# Patient Record
Sex: MALE | Race: BLACK OR AFRICAN AMERICAN | Employment: FULL TIME | URBAN - NONMETROPOLITAN AREA
[De-identification: names, ages, dates, MRNs, and addresses within clinical notes are randomized per-mention and may not be internally consistent; named-entity substitution may affect disease eponyms.]

---

## 2017-08-13 ENCOUNTER — APPOINTMENT (OUTPATIENT)
Dept: GENERAL RADIOLOGY | Age: 42
End: 2017-08-13
Payer: COMMERCIAL

## 2017-08-13 ENCOUNTER — HOSPITAL ENCOUNTER (EMERGENCY)
Age: 42
Discharge: HOME | End: 2017-08-13
Attending: EMERGENCY MEDICINE
Payer: COMMERCIAL

## 2017-08-13 VITALS
WEIGHT: 270 LBS | HEIGHT: 74 IN | RESPIRATION RATE: 20 BRPM | BODY MASS INDEX: 34.65 KG/M2 | SYSTOLIC BLOOD PRESSURE: 134 MMHG | HEART RATE: 84 BPM | DIASTOLIC BLOOD PRESSURE: 80 MMHG | TEMPERATURE: 98.3 F | OXYGEN SATURATION: 97 %

## 2017-08-13 DIAGNOSIS — J02.9 ACUTE PHARYNGITIS, UNSPECIFIED ETIOLOGY: ICD-10-CM

## 2017-08-13 DIAGNOSIS — S60.221A CONTUSION OF RIGHT HAND, INITIAL ENCOUNTER: ICD-10-CM

## 2017-08-13 DIAGNOSIS — S46.912A SHOULDER STRAIN, LEFT, INITIAL ENCOUNTER: Primary | ICD-10-CM

## 2017-08-13 PROCEDURE — 96372 THER/PROPH/DIAG INJ SC/IM: CPT

## 2017-08-13 PROCEDURE — A4565 SLINGS: HCPCS

## 2017-08-13 PROCEDURE — 6360000002 HC RX W HCPCS: Performed by: EMERGENCY MEDICINE

## 2017-08-13 PROCEDURE — 73030 X-RAY EXAM OF SHOULDER: CPT

## 2017-08-13 PROCEDURE — 99283 EMERGENCY DEPT VISIT LOW MDM: CPT

## 2017-08-13 PROCEDURE — 71020 XR CHEST STANDARD TWO VW: CPT

## 2017-08-13 PROCEDURE — 6370000000 HC RX 637 (ALT 250 FOR IP): Performed by: EMERGENCY MEDICINE

## 2017-08-13 RX ORDER — CYCLOBENZAPRINE HCL 10 MG
10 TABLET ORAL 3 TIMES DAILY PRN
Qty: 30 TABLET | Refills: 0 | Status: SHIPPED | OUTPATIENT
Start: 2017-08-13 | End: 2017-08-23

## 2017-08-13 RX ORDER — OXYCODONE HYDROCHLORIDE AND ACETAMINOPHEN 5; 325 MG/1; MG/1
1 TABLET ORAL EVERY 4 HOURS PRN
Qty: 20 TABLET | Refills: 0 | Status: SHIPPED | OUTPATIENT
Start: 2017-08-13 | End: 2021-02-02

## 2017-08-13 RX ORDER — NAPROXEN 500 MG/1
500 TABLET ORAL 2 TIMES DAILY
Qty: 60 TABLET | Refills: 0 | Status: SHIPPED | OUTPATIENT
Start: 2017-08-13 | End: 2021-11-27 | Stop reason: ALTCHOICE

## 2017-08-13 RX ORDER — AMOXICILLIN AND CLAVULANATE POTASSIUM 875; 125 MG/1; MG/1
1 TABLET, FILM COATED ORAL 2 TIMES DAILY
Qty: 20 TABLET | Refills: 0 | Status: SHIPPED | OUTPATIENT
Start: 2017-08-13 | End: 2017-08-23

## 2017-08-13 RX ORDER — ORPHENADRINE CITRATE 30 MG/ML
60 INJECTION INTRAMUSCULAR; INTRAVENOUS ONCE
Status: COMPLETED | OUTPATIENT
Start: 2017-08-13 | End: 2017-08-13

## 2017-08-13 RX ORDER — AMOXICILLIN AND CLAVULANATE POTASSIUM 875; 125 MG/1; MG/1
1 TABLET, FILM COATED ORAL ONCE
Status: COMPLETED | OUTPATIENT
Start: 2017-08-13 | End: 2017-08-13

## 2017-08-13 RX ORDER — KETOROLAC TROMETHAMINE 30 MG/ML
30 INJECTION, SOLUTION INTRAMUSCULAR; INTRAVENOUS ONCE
Status: COMPLETED | OUTPATIENT
Start: 2017-08-13 | End: 2017-08-13

## 2017-08-13 RX ORDER — METHYLPREDNISOLONE SODIUM SUCCINATE 125 MG/2ML
80 INJECTION, POWDER, LYOPHILIZED, FOR SOLUTION INTRAMUSCULAR; INTRAVENOUS ONCE
Status: COMPLETED | OUTPATIENT
Start: 2017-08-13 | End: 2017-08-13

## 2017-08-13 RX ADMIN — ORPHENADRINE CITRATE 60 MG: 30 INJECTION INTRAMUSCULAR; INTRAVENOUS at 21:30

## 2017-08-13 RX ADMIN — HYDROMORPHONE HYDROCHLORIDE 1 MG: 1 INJECTION, SOLUTION INTRAMUSCULAR; INTRAVENOUS; SUBCUTANEOUS at 21:30

## 2017-08-13 RX ADMIN — KETOROLAC TROMETHAMINE 30 MG: 30 INJECTION, SOLUTION INTRAMUSCULAR at 21:30

## 2017-08-13 RX ADMIN — METHYLPREDNISOLONE SODIUM SUCCINATE 80 MG: 125 INJECTION, POWDER, FOR SOLUTION INTRAMUSCULAR; INTRAVENOUS at 21:30

## 2017-08-13 RX ADMIN — AMOXICILLIN AND CLAVULANATE POTASSIUM 1 TABLET: 875; 125 TABLET, FILM COATED ORAL at 21:30

## 2017-08-13 ASSESSMENT — PAIN DESCRIPTION - LOCATION
LOCATION: SHOULDER
LOCATION: HAND

## 2017-08-13 ASSESSMENT — PAIN DESCRIPTION - DESCRIPTORS
DESCRIPTORS: DULL;ACHING;SHARP;STABBING
DESCRIPTORS: ACHING

## 2017-08-13 ASSESSMENT — ENCOUNTER SYMPTOMS
ABDOMINAL PAIN: 0
CONSTIPATION: 0
EYE PAIN: 0
TROUBLE SWALLOWING: 0
VOMITING: 0
WHEEZING: 0
CHOKING: 0
BLOOD IN STOOL: 0
CHEST TIGHTNESS: 0
VOICE CHANGE: 0
SHORTNESS OF BREATH: 0
EYE ITCHING: 0
NAUSEA: 0
SORE THROAT: 1
DIARRHEA: 0
RHINORRHEA: 0
EYE DISCHARGE: 0
PHOTOPHOBIA: 0
BACK PAIN: 0
ABDOMINAL DISTENTION: 0
EYE REDNESS: 0
SINUS PRESSURE: 0
COUGH: 0

## 2017-08-13 ASSESSMENT — PAIN DESCRIPTION - PAIN TYPE
TYPE: ACUTE PAIN
TYPE: ACUTE PAIN

## 2017-08-13 ASSESSMENT — PAIN DESCRIPTION - ORIENTATION
ORIENTATION: LEFT
ORIENTATION: LEFT

## 2017-08-13 ASSESSMENT — PAIN DESCRIPTION - FREQUENCY
FREQUENCY: CONTINUOUS
FREQUENCY: CONTINUOUS

## 2017-08-13 ASSESSMENT — PAIN SCALES - GENERAL
PAINLEVEL_OUTOF10: 8
PAINLEVEL_OUTOF10: 2
PAINLEVEL_OUTOF10: 7

## 2019-11-02 ENCOUNTER — HOSPITAL ENCOUNTER (EMERGENCY)
Age: 44
Discharge: HOME | End: 2019-11-02
Payer: COMMERCIAL

## 2019-11-02 VITALS
OXYGEN SATURATION: 97 % | SYSTOLIC BLOOD PRESSURE: 127 MMHG | WEIGHT: 285 LBS | HEART RATE: 100 BPM | TEMPERATURE: 100.1 F | RESPIRATION RATE: 20 BRPM | DIASTOLIC BLOOD PRESSURE: 79 MMHG | BODY MASS INDEX: 36.57 KG/M2 | HEIGHT: 74 IN

## 2019-11-02 DIAGNOSIS — J01.00 ACUTE NON-RECURRENT MAXILLARY SINUSITIS: Primary | ICD-10-CM

## 2019-11-02 DIAGNOSIS — J40 BRONCHITIS: ICD-10-CM

## 2019-11-02 DIAGNOSIS — R09.89 CHEST CONGESTION: ICD-10-CM

## 2019-11-02 PROCEDURE — 99212 OFFICE O/P EST SF 10 MIN: CPT

## 2019-11-02 PROCEDURE — 99213 OFFICE O/P EST LOW 20 MIN: CPT | Performed by: NURSE PRACTITIONER

## 2019-11-02 RX ORDER — TOPIRAMATE 25 MG/1
25 CAPSULE, COATED PELLETS ORAL 2 TIMES DAILY
COMMUNITY
End: 2021-02-02

## 2019-11-02 RX ORDER — AZITHROMYCIN 250 MG/1
TABLET, FILM COATED ORAL
Qty: 6 TABLET | Refills: 0 | Status: SHIPPED | OUTPATIENT
Start: 2019-11-02 | End: 2021-02-02

## 2019-11-02 RX ORDER — PREDNISONE 10 MG/1
TABLET ORAL
Qty: 21 TABLET | Refills: 0 | Status: SHIPPED | OUTPATIENT
Start: 2019-11-02 | End: 2021-02-02

## 2019-11-02 RX ORDER — ALBUTEROL SULFATE 2.5 MG/3ML
2.5 SOLUTION RESPIRATORY (INHALATION) EVERY 4 HOURS PRN
Qty: 120 EACH | Refills: 0 | Status: SHIPPED | OUTPATIENT
Start: 2019-11-02 | End: 2021-02-02

## 2019-11-02 ASSESSMENT — ENCOUNTER SYMPTOMS
RHINORRHEA: 1
SORE THROAT: 1
SINUS PAIN: 1
COLOR CHANGE: 0
BACK PAIN: 0
SINUS PRESSURE: 1

## 2019-11-02 ASSESSMENT — PAIN DESCRIPTION - PAIN TYPE: TYPE: ACUTE PAIN

## 2019-11-02 ASSESSMENT — PAIN DESCRIPTION - LOCATION: LOCATION: CHEST

## 2019-11-02 ASSESSMENT — PAIN DESCRIPTION - ORIENTATION: ORIENTATION: MID

## 2019-11-02 ASSESSMENT — PAIN DESCRIPTION - FREQUENCY: FREQUENCY: INTERMITTENT

## 2020-08-22 LAB — SARS-COV-2: NOT DETECTED

## 2021-02-02 ENCOUNTER — APPOINTMENT (OUTPATIENT)
Dept: GENERAL RADIOLOGY | Age: 46
End: 2021-02-02
Payer: COMMERCIAL

## 2021-02-02 ENCOUNTER — HOSPITAL ENCOUNTER (EMERGENCY)
Age: 46
Discharge: HOME | End: 2021-02-02
Payer: COMMERCIAL

## 2021-02-02 VITALS
HEART RATE: 99 BPM | HEIGHT: 74 IN | RESPIRATION RATE: 18 BRPM | BODY MASS INDEX: 38.89 KG/M2 | SYSTOLIC BLOOD PRESSURE: 141 MMHG | WEIGHT: 303 LBS | DIASTOLIC BLOOD PRESSURE: 86 MMHG | OXYGEN SATURATION: 95 % | TEMPERATURE: 100.1 F

## 2021-02-02 DIAGNOSIS — J18.9 PNEUMONIA OF LEFT LOWER LOBE DUE TO INFECTIOUS ORGANISM: Primary | ICD-10-CM

## 2021-02-02 DIAGNOSIS — Z20.822 SUSPECTED COVID-19 VIRUS INFECTION: ICD-10-CM

## 2021-02-02 LAB
FLU A ANTIGEN: NEGATIVE
INFLUENZA B AG, EIA: NEGATIVE

## 2021-02-02 PROCEDURE — 99214 OFFICE O/P EST MOD 30 MIN: CPT | Performed by: NURSE PRACTITIONER

## 2021-02-02 PROCEDURE — 71046 X-RAY EXAM CHEST 2 VIEWS: CPT

## 2021-02-02 PROCEDURE — 87804 INFLUENZA ASSAY W/OPTIC: CPT

## 2021-02-02 PROCEDURE — 99213 OFFICE O/P EST LOW 20 MIN: CPT

## 2021-02-02 RX ORDER — ACETAMINOPHEN 500 MG
500 TABLET ORAL EVERY 4 HOURS PRN
Qty: 20 TABLET | Refills: 0 | Status: SHIPPED | OUTPATIENT
Start: 2021-02-02 | End: 2021-02-12

## 2021-02-02 RX ORDER — GUAIFENESIN 600 MG/1
600 TABLET, EXTENDED RELEASE ORAL 2 TIMES DAILY
Qty: 20 TABLET | Refills: 0 | Status: SHIPPED | OUTPATIENT
Start: 2021-02-02 | End: 2021-02-12

## 2021-02-02 RX ORDER — ACETAMINOPHEN 500 MG
1000 TABLET ORAL EVERY 6 HOURS PRN
COMMUNITY
End: 2021-02-02

## 2021-02-02 RX ORDER — DOXYCYCLINE HYCLATE 100 MG
100 TABLET ORAL 2 TIMES DAILY
Qty: 20 TABLET | Refills: 0 | Status: SHIPPED | OUTPATIENT
Start: 2021-02-02 | End: 2021-02-07

## 2021-02-02 RX ORDER — ALBUTEROL SULFATE 90 UG/1
2 AEROSOL, METERED RESPIRATORY (INHALATION) EVERY 4 HOURS PRN
Qty: 1 INHALER | Refills: 0 | Status: SHIPPED | OUTPATIENT
Start: 2021-02-02 | End: 2022-01-02 | Stop reason: SDUPTHER

## 2021-02-02 ASSESSMENT — ENCOUNTER SYMPTOMS
COUGH: 0
EYE ITCHING: 0
SINUS PRESSURE: 0
ABDOMINAL PAIN: 0
DIARRHEA: 1
SINUS PAIN: 0
VOMITING: 0
SORE THROAT: 0
WHEEZING: 0
NAUSEA: 1
SHORTNESS OF BREATH: 0
EYE REDNESS: 0

## 2021-02-02 ASSESSMENT — PAIN SCALES - GENERAL: PAINLEVEL_OUTOF10: 7

## 2021-02-02 NOTE — ED PROVIDER NOTES
[id number]       Chief Complaint   Patient presents with    Headache     behind eyes,  eyes feeel like muscle sore, hot and chills,        Nurses Notes reviewed and I agree except as noted in the HPI. HISTORY OF PRESENT ILLNESS   Guerita Naik is a 39 y.o. male who presents for evaluation of not feeling well for a week. He states that he has a headache that almost feels like a sinus infection/pressure but he has no congestion, stuffy nose, or runny nose. He states that the headaches have been responding to Tylenol. He states that he has had fever, chills, and body aches. He states that he is tested twice weekly for COVID-19 through his employer. He states that he was tested on Monday and is awaiting those results. REVIEW OF SYSTEMS     Review of Systems   Constitutional: Positive for chills. Negative for fatigue and fever. HENT: Negative for congestion, ear pain, sinus pressure, sinus pain and sore throat. Eyes: Negative for redness and itching. Respiratory: Negative for cough, shortness of breath and wheezing. Cardiovascular: Negative for chest pain and palpitations. Gastrointestinal: Positive for diarrhea and nausea. Negative for abdominal pain and vomiting. Musculoskeletal: Positive for myalgias. Negative for joint swelling. Skin: Negative for rash. Allergic/Immunologic: Negative for environmental allergies and food allergies. Neurological: Positive for headaches. Negative for dizziness. PAST MEDICAL HISTORY         Diagnosis Date    Gout     Migraine        SURGICAL HISTORY     Patient  has a past surgical history that includes fracture surgery; Testicle torsion reduction; and Nose surgery.     CURRENT MEDICATIONS       Previous Medications    ALLOPURINOL (ZYLOPRIM) 100 MG TABLET    Take 1 tablet by mouth daily    IBUPROFEN (ADVIL;MOTRIN) 800 MG TABLET    Take 1 tablet by mouth every 8 hours as needed for Pain    NAPROXEN (NAPROSYN) 500 MG TABLET    Take 1 tablet by mouth 2 times daily    OXYMETAZOLINE (AFRIN 12 HOUR) 0.05 % NASAL SPRAY    2 sprays by Nasal route 2 times daily. RESPIRATORY THERAPY SUPPLIES (NEBULIZER COMPRESSOR) KIT    1 kit by Does not apply route once for 1 dose       ALLERGIES     Patient is is allergic to shellfish-derived products. FAMILY HISTORY     Patient'sfamily history includes Diabetes in his father and mother; Heart Disease in his mother; High Blood Pressure in his father and mother. SOCIAL HISTORY     Patient  reports that he has been smoking cigarettes. He has been smoking about 1.00 pack per day. He has never used smokeless tobacco. He reports previous alcohol use. He reports that he does not use drugs. PHYSICAL EXAM     ED TRIAGE VITALS  BP: (!) 141/86, Temp: 100.1 °F (37.8 °C), Pulse: 99, Resp: 18, SpO2: 95 %  Physical Exam  Vitals signs and nursing note reviewed. Constitutional:       General: He is not in acute distress. Appearance: Normal appearance. He is well-developed and well-groomed. HENT:      Head: Normocephalic and atraumatic. Right Ear: External ear normal.      Left Ear: External ear normal.      Mouth/Throat:      Lips: Pink. Mouth: Mucous membranes are moist.   Eyes:      Conjunctiva/sclera:      Right eye: Right conjunctiva is not injected. Left eye: Left conjunctiva is not injected. Pupils: Pupils are equal.   Neck:      Musculoskeletal: Normal range of motion. Cardiovascular:      Rate and Rhythm: Normal rate. Heart sounds: Normal heart sounds. Pulmonary:      Effort: Pulmonary effort is normal. No respiratory distress. Breath sounds: Normal air entry. Examination of the right-lower field reveals decreased breath sounds. Examination of the left-lower field reveals decreased breath sounds. Decreased breath sounds present. No wheezing or rhonchi. Lymphadenopathy:      Cervical: No cervical adenopathy.    Skin:

## 2021-02-02 NOTE — ED TRIAGE NOTES
TO room 1 c/o headache, eyes hurt, chills, hot flashes x 1 week.  Test 2 tiems week for covid  for work  Fridays was negativce and mondays still pending

## 2021-02-06 PROCEDURE — 99285 EMERGENCY DEPT VISIT HI MDM: CPT

## 2021-02-07 ENCOUNTER — APPOINTMENT (OUTPATIENT)
Dept: CT IMAGING | Age: 46
End: 2021-02-07
Payer: COMMERCIAL

## 2021-02-07 ENCOUNTER — HOSPITAL ENCOUNTER (EMERGENCY)
Age: 46
Discharge: HOME | End: 2021-02-07
Payer: COMMERCIAL

## 2021-02-07 ENCOUNTER — APPOINTMENT (OUTPATIENT)
Dept: GENERAL RADIOLOGY | Age: 46
End: 2021-02-07
Payer: COMMERCIAL

## 2021-02-07 VITALS
BODY MASS INDEX: 38.5 KG/M2 | TEMPERATURE: 98.7 F | HEART RATE: 78 BPM | DIASTOLIC BLOOD PRESSURE: 92 MMHG | HEIGHT: 74 IN | WEIGHT: 300 LBS | OXYGEN SATURATION: 95 % | RESPIRATION RATE: 14 BRPM | SYSTOLIC BLOOD PRESSURE: 103 MMHG

## 2021-02-07 DIAGNOSIS — J01.00 ACUTE MAXILLARY SINUSITIS, RECURRENCE NOT SPECIFIED: ICD-10-CM

## 2021-02-07 DIAGNOSIS — J12.9 VIRAL PNEUMONIA: Primary | ICD-10-CM

## 2021-02-07 LAB
ANION GAP SERPL CALCULATED.3IONS-SCNC: 10 MEQ/L (ref 8–16)
ATYPICAL LYMPHOCYTES: ABNORMAL %
BASOPHILS # BLD: 0.4 %
BASOPHILS ABSOLUTE: 0 THOU/MM3 (ref 0–0.1)
BUN BLDV-MCNC: 20 MG/DL (ref 7–22)
C-REACTIVE PROTEIN: 1.44 MG/DL (ref 0–1)
CALCIUM SERPL-MCNC: 8.8 MG/DL (ref 8.5–10.5)
CHLORIDE BLD-SCNC: 103 MEQ/L (ref 98–111)
CO2: 21 MEQ/L (ref 23–33)
CREAT SERPL-MCNC: 1.1 MG/DL (ref 0.4–1.2)
D-DIMER QUANTITATIVE: 499 NG/ML FEU (ref 0–500)
EKG ATRIAL RATE: 97 BPM
EKG P AXIS: 70 DEGREES
EKG P-R INTERVAL: 154 MS
EKG Q-T INTERVAL: 336 MS
EKG QRS DURATION: 88 MS
EKG QTC CALCULATION (BAZETT): 424 MS
EKG R AXIS: 48 DEGREES
EKG T AXIS: 37 DEGREES
EKG VENTRICULAR RATE: 96 BPM
EOSINOPHIL # BLD: 0.4 %
EOSINOPHILS ABSOLUTE: 0 THOU/MM3 (ref 0–0.4)
ERYTHROCYTE [DISTWIDTH] IN BLOOD BY AUTOMATED COUNT: 11.9 % (ref 11.5–14.5)
ERYTHROCYTE [DISTWIDTH] IN BLOOD BY AUTOMATED COUNT: 39.5 FL (ref 35–45)
FERRITIN: 1682 NG/ML (ref 22–322)
GFR SERPL CREATININE-BSD FRML MDRD: 87 ML/MIN/1.73M2
GLUCOSE BLD-MCNC: 131 MG/DL (ref 70–108)
HCT VFR BLD CALC: 42.8 % (ref 42–52)
HEMOGLOBIN: 13.7 GM/DL (ref 14–18)
IMMATURE GRANS (ABS): 0.09 THOU/MM3 (ref 0–0.07)
IMMATURE GRANULOCYTES: 1.3 %
LD: 344 U/L (ref 100–190)
LYMPHOCYTES # BLD: 38.6 %
LYMPHOCYTES ABSOLUTE: 2.7 THOU/MM3 (ref 1–4.8)
MCH RBC QN AUTO: 29 PG (ref 26–33)
MCHC RBC AUTO-ENTMCNC: 32 GM/DL (ref 32.2–35.5)
MCV RBC AUTO: 90.7 FL (ref 80–94)
MONOCYTES # BLD: 10.2 %
MONOCYTES ABSOLUTE: 0.7 THOU/MM3 (ref 0.4–1.3)
NUCLEATED RED BLOOD CELLS: 0 /100 WBC
OSMOLALITY CALCULATION: 272.7 MOSMOL/KG (ref 275–300)
PLATELET # BLD: 217 THOU/MM3 (ref 130–400)
PLATELET ESTIMATE: ADEQUATE
PMV BLD AUTO: 9 FL (ref 9.4–12.4)
POTASSIUM REFLEX MAGNESIUM: 4.3 MEQ/L (ref 3.5–5.2)
PRO-BNP: 6.1 PG/ML (ref 0–450)
PROCALCITONIN: 0.27 NG/ML (ref 0.01–0.09)
RBC # BLD: 4.72 MILL/MM3 (ref 4.7–6.1)
REASON FOR REJECTION: NORMAL
REJECTED TEST: NORMAL
SARS-COV-2, NAAT: NOT DETECTED
SCAN OF BLOOD SMEAR: NORMAL
SEG NEUTROPHILS: 49.1 %
SEGMENTED NEUTROPHILS ABSOLUTE COUNT: 3.5 THOU/MM3 (ref 1.8–7.7)
SODIUM BLD-SCNC: 134 MEQ/L (ref 135–145)
TOTAL CK: 164 U/L (ref 55–170)
TROPONIN T: < 0.01 NG/ML
WBC # BLD: 7.1 THOU/MM3 (ref 4.8–10.8)

## 2021-02-07 PROCEDURE — U0002 COVID-19 LAB TEST NON-CDC: HCPCS

## 2021-02-07 PROCEDURE — 83615 LACTATE (LD) (LDH) ENZYME: CPT

## 2021-02-07 PROCEDURE — 6360000004 HC RX CONTRAST MEDICATION: Performed by: NURSE PRACTITIONER

## 2021-02-07 PROCEDURE — 85025 COMPLETE CBC W/AUTO DIFF WBC: CPT

## 2021-02-07 PROCEDURE — 82550 ASSAY OF CK (CPK): CPT

## 2021-02-07 PROCEDURE — 94761 N-INVAS EAR/PLS OXIMETRY MLT: CPT

## 2021-02-07 PROCEDURE — 83880 ASSAY OF NATRIURETIC PEPTIDE: CPT

## 2021-02-07 PROCEDURE — 96375 TX/PRO/DX INJ NEW DRUG ADDON: CPT

## 2021-02-07 PROCEDURE — 96374 THER/PROPH/DIAG INJ IV PUSH: CPT

## 2021-02-07 PROCEDURE — 84484 ASSAY OF TROPONIN QUANT: CPT

## 2021-02-07 PROCEDURE — 6370000000 HC RX 637 (ALT 250 FOR IP): Performed by: NURSE PRACTITIONER

## 2021-02-07 PROCEDURE — 71260 CT THORAX DX C+: CPT

## 2021-02-07 PROCEDURE — 94640 AIRWAY INHALATION TREATMENT: CPT

## 2021-02-07 PROCEDURE — 71046 X-RAY EXAM CHEST 2 VIEWS: CPT

## 2021-02-07 PROCEDURE — 82728 ASSAY OF FERRITIN: CPT

## 2021-02-07 PROCEDURE — 93010 ELECTROCARDIOGRAM REPORT: CPT | Performed by: NUCLEAR MEDICINE

## 2021-02-07 PROCEDURE — 85379 FIBRIN DEGRADATION QUANT: CPT

## 2021-02-07 PROCEDURE — 36415 COLL VENOUS BLD VENIPUNCTURE: CPT

## 2021-02-07 PROCEDURE — 86140 C-REACTIVE PROTEIN: CPT

## 2021-02-07 PROCEDURE — 80048 BASIC METABOLIC PNL TOTAL CA: CPT

## 2021-02-07 PROCEDURE — 6360000002 HC RX W HCPCS: Performed by: NURSE PRACTITIONER

## 2021-02-07 PROCEDURE — 84145 PROCALCITONIN (PCT): CPT

## 2021-02-07 PROCEDURE — 93005 ELECTROCARDIOGRAM TRACING: CPT | Performed by: EMERGENCY MEDICINE

## 2021-02-07 RX ORDER — PREDNISONE 20 MG/1
20 TABLET ORAL DAILY
Qty: 5 TABLET | Refills: 0 | Status: SHIPPED | OUTPATIENT
Start: 2021-02-07 | End: 2021-02-12

## 2021-02-07 RX ORDER — IPRATROPIUM BROMIDE AND ALBUTEROL SULFATE 2.5; .5 MG/3ML; MG/3ML
1 SOLUTION RESPIRATORY (INHALATION) ONCE
Status: COMPLETED | OUTPATIENT
Start: 2021-02-07 | End: 2021-02-07

## 2021-02-07 RX ORDER — DIPHENHYDRAMINE HYDROCHLORIDE 50 MG/ML
50 INJECTION INTRAMUSCULAR; INTRAVENOUS ONCE
Status: COMPLETED | OUTPATIENT
Start: 2021-02-07 | End: 2021-02-07

## 2021-02-07 RX ORDER — METHYLPREDNISOLONE SODIUM SUCCINATE 125 MG/2ML
125 INJECTION, POWDER, LYOPHILIZED, FOR SOLUTION INTRAMUSCULAR; INTRAVENOUS ONCE
Status: COMPLETED | OUTPATIENT
Start: 2021-02-07 | End: 2021-02-07

## 2021-02-07 RX ORDER — KETOROLAC TROMETHAMINE 10 MG/1
10 TABLET, FILM COATED ORAL EVERY 6 HOURS PRN
Qty: 20 TABLET | Refills: 0 | Status: SHIPPED | OUTPATIENT
Start: 2021-02-07

## 2021-02-07 RX ORDER — AMOXICILLIN AND CLAVULANATE POTASSIUM 875; 125 MG/1; MG/1
1 TABLET, FILM COATED ORAL 2 TIMES DAILY
Qty: 20 TABLET | Refills: 0 | Status: SHIPPED | OUTPATIENT
Start: 2021-02-07 | End: 2021-02-17

## 2021-02-07 RX ORDER — KETOROLAC TROMETHAMINE 30 MG/ML
30 INJECTION, SOLUTION INTRAMUSCULAR; INTRAVENOUS ONCE
Status: COMPLETED | OUTPATIENT
Start: 2021-02-07 | End: 2021-02-07

## 2021-02-07 RX ORDER — ALBUTEROL SULFATE 90 UG/1
2 AEROSOL, METERED RESPIRATORY (INHALATION) 4 TIMES DAILY PRN
Qty: 1 INHALER | Refills: 0 | Status: SHIPPED | OUTPATIENT
Start: 2021-02-07 | End: 2022-01-02 | Stop reason: SDUPTHER

## 2021-02-07 RX ORDER — CLINDAMYCIN HYDROCHLORIDE 300 MG/1
300 CAPSULE ORAL 3 TIMES DAILY
COMMUNITY
End: 2021-02-07

## 2021-02-07 RX ADMIN — KETOROLAC TROMETHAMINE 30 MG: 30 INJECTION, SOLUTION INTRAMUSCULAR at 01:17

## 2021-02-07 RX ADMIN — IOPAMIDOL 80 ML: 755 INJECTION, SOLUTION INTRAVENOUS at 01:56

## 2021-02-07 RX ADMIN — METHYLPREDNISOLONE SODIUM SUCCINATE 125 MG: 125 INJECTION, POWDER, FOR SOLUTION INTRAMUSCULAR; INTRAVENOUS at 01:43

## 2021-02-07 RX ADMIN — DIPHENHYDRAMINE HYDROCHLORIDE 50 MG: 50 INJECTION INTRAMUSCULAR; INTRAVENOUS at 01:43

## 2021-02-07 RX ADMIN — IPRATROPIUM BROMIDE AND ALBUTEROL SULFATE 1 AMPULE: .5; 3 SOLUTION RESPIRATORY (INHALATION) at 02:26

## 2021-02-07 ASSESSMENT — PAIN SCALES - GENERAL
PAINLEVEL_OUTOF10: 6
PAINLEVEL_OUTOF10: 6

## 2021-02-07 ASSESSMENT — PAIN DESCRIPTION - PAIN TYPE: TYPE: ACUTE PAIN

## 2021-02-07 ASSESSMENT — PAIN DESCRIPTION - DESCRIPTORS: DESCRIPTORS: ACHING

## 2021-02-07 NOTE — ED NOTES
Pt arrives to the ED for sob. Pt was seen at urgent care on Tuesday and was diagnosed with pneumonia. Pt states he was given antibiotics and sent home. Pt states he was not feeling better the next day and went to [facility] states he was diagnosed with bilateral pneumonia and the flu. Pt received fluids, a steroid and pain medication. Pt states he is still not feeling better and actually feels worse. Pt states in addition to the sob his neck, and head hurt rating a 6/10. Pt denies any chest pain, or n/v. Pt states he has been running a fever and has been medicating with tylenol and ibuprofen. Pt vitals are stable. Pt respirations are even and unlabored.       Mindy Harley RN  02/07/21 [id number]

## 2021-02-07 NOTE — ED NOTES
Pt. Resting in bed with even and unlabored respirations. Pt. Pain is a 3/10 at this time. . Pt. Updated about plan of care and treatment. Fpt re covid swabbed at this time. Pt. Has no further concerns, questions or needs at this time. Call light within reach.         Inocente Crigler, RN  02/07/21 0572

## 2021-02-08 ASSESSMENT — ENCOUNTER SYMPTOMS
BACK PAIN: 0
APNEA: 0
SORE THROAT: 0
DIARRHEA: 0
WHEEZING: 0
SINUS PAIN: 0
VOMITING: 0
NAUSEA: 0
PHOTOPHOBIA: 0
SHORTNESS OF BREATH: 1
COLOR CHANGE: 0
FACIAL SWELLING: 0
RHINORRHEA: 0
CONSTIPATION: 0
TROUBLE SWALLOWING: 0
SINUS PRESSURE: 1
ABDOMINAL DISTENTION: 0
COUGH: 1
ABDOMINAL PAIN: 0
CHEST TIGHTNESS: 0

## 2021-02-08 NOTE — ED PROVIDER NOTES
[facility] COMPLAINT       Chief Complaint   Patient presents with    Shortness of Breath       Nurses Notes reviewed and I agree except as noted in the HPI. HISTORY OF PRESENT ILLNESS    Cristy Alegria is a 39 y.o. male who presents to the Emergency Department for the evaluation of cough, congestion, sinus pressure, headache. Was seen at urgent care 5 days ago and diagnosed with pneumonia, started on doxycycline. At that time send out COVID-19 swab was completed. Symptoms did not improve with antibiotics so he went to [facility] on February 4, was again diagnosed with pneumonia however at that time provider changed antibiotics from doxycycline to clindamycin. 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 Reports that all tests have been negative. Reports no improvement with antibiotic regimen. Today he complains that he has more sinus pressure and headache than shortness of breath. Denies sick contacts, reports subjective fever, denies known sick contacts, denies loss of sense of taste or smell      The HPI was provided by the patient. REVIEW OF SYSTEMS     Review of Systems   Constitutional: Negative for chills, diaphoresis, fatigue and fever. HENT: Positive for congestion and sinus pressure. Negative for ear pain, facial swelling, rhinorrhea, sinus pain, sneezing, sore throat and trouble swallowing. Eyes: Negative for photophobia. Respiratory: Positive for cough and shortness of breath. Negative for apnea, chest tightness and wheezing. Cardiovascular: Negative for chest pain and palpitations. Gastrointestinal: Negative for abdominal distention, abdominal pain, constipation, diarrhea, nausea and vomiting. Endocrine: Negative for polydipsia, polyphagia and polyuria.    Genitourinary: Negative for decreased urine volume, dysuria, flank pain, frequency and urgency. Musculoskeletal: Negative for arthralgias, back pain, joint swelling, myalgias, neck pain and neck stiffness. Skin: Negative for color change and wound. Neurological: Positive for headaches. Negative for dizziness, tremors, weakness, light-headedness and numbness. PAST MEDICAL HISTORY    has a past medical history of Gout and Migraine. SURGICAL HISTORY      has a past surgical history that includes fracture surgery; Testicle torsion reduction; and Nose surgery. CURRENT MEDICATIONS       Discharge Medication List as of 2/7/2021  3:01 AM      CONTINUE these medications which have NOT CHANGED    Details   !! albuterol sulfate  (90 Base) MCG/ACT inhaler Inhale 2 puffs into the lungs every 4 hours as needed for Wheezing or Shortness of Breath, Disp-1 Inhaler, R-0Normal      guaiFENesin (MUCINEX) 600 MG extended release tablet Take 1 tablet by mouth 2 times daily for 10 days, Disp-20 tablet, R-0Normal      naproxen (NAPROSYN) 500 MG tablet Take 1 tablet by mouth 2 times daily, Disp-60 tablet, R-0Print      allopurinol (ZYLOPRIM) 100 MG tablet Take 1 tablet by mouth daily, Disp-30 tablet, R-3      acetaminophen (TYLENOL) 500 MG tablet Take 1 tablet by mouth every 4 hours as needed for Pain or Fever, Disp-20 tablet, R-0Normal      Respiratory Therapy Supplies (NEBULIZER COMPRESSOR) KIT ONCE Starting Sat 11/2/2019, For 1 dose, Disp-1 kit, R-0, Print      oxymetazoline (AFRIN 12 HOUR) 0.05 % nasal spray 2 sprays by Nasal route 2 times daily. !! - Potential duplicate medications found. Please discuss with provider. ALLERGIES     is allergic to shellfish-derived products. FAMILY HISTORY     He indicated that his mother is alive. He indicated that his father is alive. family history includes Diabetes in his father and mother; Heart Disease in his mother; High Blood Pressure in his father and mother.     SOCIAL HISTORY      reports that he has been smoking cigarettes. He has been smoking about 1.00 pack per day. He has never used smokeless tobacco. He reports previous alcohol use. He reports that he does not use drugs. PHYSICAL EXAM     INITIAL VITALS:  height is 6' 2\" (1.88 m) and weight is 300 lb (136.1 kg). His temperature is 98.7 °F (37.1 °C). His blood pressure is 103/92 (abnormal) and his pulse is 78. His respiration is 14 and oxygen saturation is 95%. Physical Exam  Vitals signs and nursing note reviewed. Constitutional:       General: He is awake. He is not in acute distress. Appearance: Normal appearance. He is well-developed and normal weight. He is ill-appearing. He is not toxic-appearing or diaphoretic. HENT:      Head: Normocephalic and atraumatic. Nose: Nose normal.      Mouth/Throat:      Mouth: Mucous membranes are moist.      Pharynx: Oropharynx is clear. Eyes:      Extraocular Movements: Extraocular movements intact. Pupils: Pupils are equal, round, and reactive to light. Neck:      Musculoskeletal: Normal range of motion and neck supple. No neck rigidity or muscular tenderness. Cardiovascular:      Rate and Rhythm: Normal rate and regular rhythm. No extrasystoles are present. Pulses: Normal pulses. Heart sounds: Normal heart sounds, S1 normal and S2 normal. Heart sounds not distant. No murmur. No friction rub. No gallop. Pulmonary:      Effort: Pulmonary effort is normal. No tachypnea, bradypnea, accessory muscle usage, prolonged expiration, respiratory distress or retractions. Breath sounds: Normal breath sounds. No stridor. No wheezing, rhonchi or rales. Chest:      Chest wall: No tenderness. Abdominal:      General: Abdomen is flat. Bowel sounds are normal. There is no distension. Palpations: Abdomen is soft. There is no shifting dullness, hepatomegaly, splenomegaly or mass. Tenderness: There is no abdominal tenderness. Hernia: No hernia is present.    Musculoskeletal: Normal range of motion. General: No swelling, tenderness, deformity or signs of injury. Right lower leg: No edema. Left lower leg: No edema. Skin:     General: Skin is warm and dry. Capillary Refill: Capillary refill takes less than 2 seconds. Coloration: Skin is not jaundiced or pale. Neurological:      General: No focal deficit present. Mental Status: He is alert and oriented to person, place, and time. Mental status is at baseline. GCS: GCS eye subscore is 4. GCS verbal subscore is 5. GCS motor subscore is 6. Cranial Nerves: Cranial nerves are intact. Sensory: Sensation is intact. Psychiatric:         Mood and Affect: Mood normal.         Behavior: Behavior normal. Behavior is cooperative. DIFFERENTIAL DIAGNOSIS:   COVID-19 infection, pneumonia, viral pneumonia, bronchitis    DIAGNOSTIC RESULTS     EKG: All EKG's are interpreted by the Emergency Department Physician who either signs or Co-signs this chart in the absence of a cardiologist.    Normal sinus rhythm with rate of 96, , QRS of 88, QTc of 424. Compared with EKG from February 20, 2012 with no significant changes    EKG interpreted by ED physician    RADIOLOGY: non-plainfilm images(s) such as CT, Ultrasound and MRI are read by the radiologist.    CT CHEST W CONTRAST   Final Result   Findings consistent with viral/atypical pneumonia. Other differential    possibilities include organizing pneumonia, drug toxicity, or connective    tissue disease. This document has been electronically signed by: Jason Day MD on    02/07/2021 02:37 AM      All CT scans at this facility use dose modulation, iterative    reconstruction, and/or weight-based   dosing when appropriate to reduce radiation dose to as low as reasonably    achievable. XR CHEST (2 VW)   Final Result   Left perihilar infiltrate.       This document has been electronically signed by: Jason Day MD on    02/07/2021 12:55 AM LABS:     Labs Reviewed   BASIC METABOLIC PANEL W/ REFLEX TO MG FOR LOW K - Abnormal; Notable for the following components:       Result Value    Sodium 134 (*)     CO2 21 (*)     Glucose 131 (*)     All other components within normal limits   CBC WITH AUTO DIFFERENTIAL - Abnormal; Notable for the following components:    Hemoglobin 13.7 (*)     MCHC 32.0 (*)     MPV 9.0 (*)     Immature Grans (Abs) 0.09 (*)     All other components within normal limits   C-REACTIVE PROTEIN - Abnormal; Notable for the following components:    CRP 1.44 (*)     All other components within normal limits   PROCALCITONIN - Abnormal; Notable for the following components:    Procalcitonin 0.27 (*)     All other components within normal limits   FERRITIN - Abnormal; Notable for the following components:    Ferritin 1,682 (*)     All other components within normal limits   LACTATE DEHYDROGENASE - Abnormal; Notable for the following components:     (*)     All other components within normal limits   OSMOLALITY - Abnormal; Notable for the following components:    Osmolality Calc 272.7 (*)     All other components within normal limits   GLOMERULAR FILTRATION RATE, ESTIMATED - Abnormal; Notable for the following components:    Est, Glom Filt Rate 87 (*)     All other components within normal limits   BRAIN NATRIURETIC PEPTIDE   TROPONIN   CK   D-DIMER, QUANTITATIVE   ANION GAP   SCAN OF BLOOD SMEAR   SPECIMEN REJECTION   COVID-19       EMERGENCY DEPARTMENT COURSE:   Vitals:    Vitals:    02/07/21 0012 02/07/21 0115 02/07/21 0219 02/07/21 0226   BP: 106/65 106/67 (!) 103/92    Pulse:  90 78    Resp:  13 18 14   Temp:       SpO2:  97% 96% 95%   Weight:       Height:           6:32 AM EST: The patient was seen and evaluated. MDM:  Patient seen and evaluated for worsening cough, shortness of breath, new onset sinus pressure. Has been seen for this twice before, been diagnosed with pneumonia and placed on 2 different antibiotics. On my initial exam he was ill-appearing however no acute distress, his primary complaint was a sinus pressure and headache. Appropriate labs and imaging were ordered. Work-up for cardiac etiology/PE completed. Troponins found to be negative, EKG showed normal sinus rhythm, patient did not have elevated WBCs. Pro-Cholo of 0.27 low suspicion for bacterial infection. Believe that this is likely a viral pneumonia. He was negative for COVID-19. Due to increased sinus pain and pressure, patient will be treated with Augmentin for possible sinusitis. Discussed treating cough and congestion as viral pneumonia with symptomatic treatment. He will be prescribed steroids as well as albuterol inhaler to be used 4 times daily. Recommended follow-up with PCP in 3 to 5 days for reevaluation symptoms and to return to the emergency department for severe respiratory distress. CRITICAL CARE:   None    CONSULTS:  None    PROCEDURES:  None    FINAL IMPRESSION      1. Viral pneumonia    2. Acute maxillary sinusitis, recurrence not specified          DISPOSITION/PLAN   Discharge    PATIENT REFERRED TO:  [facility] EMERGENCY DEPT  [address] [place] [address]  [phone]  Go to   If symptoms worsen      DISCHARGE MEDICATIONS:  Discharge Medication List as of 2/7/2021  3:01 AM      START taking these medications    Details   !! albuterol sulfate HFA (VENTOLIN HFA) 108 (90 Base) MCG/ACT inhaler Inhale 2 puffs into the lungs 4 times daily as needed for Wheezing, Disp-1 Inhaler, R-0Print      predniSONE (DELTASONE) 20 MG tablet Take 1 tablet by mouth daily for 5 days, Disp-5 tablet, R-0Print      ketorolac (TORADOL) 10 MG tablet Take 1 tablet by mouth every 6 hours as needed for Pain, Disp-20 tablet, R-0Print      amoxicillin-clavulanate (AUGMENTIN) 875-125 MG per tablet Take 1 tablet by mouth 2 times daily for 10 days, Disp-20 tablet, R-0Print       !! - Potential duplicate medications found. Please discuss with provider. (Please note that portions of this note were completed with a voice recognition program.  Efforts were made to edit the dictations but occasionally words are mis-transcribed.)    The patient was given an opportunity to see the Emergency Attending. The patient voiced understanding that I was a Mid-LevelProvider and was in agreement with being seen independently by myself. Provider:  I personally performed the services described in the documentation, reviewed and edited the documentation which was dictated to the scribe in my presence, and it accurately records my words and actions.     NEREYDA Doty CNP, 2/8/21, 6:37 AM       NEREYDA Doty CNP  02/08/21 [id number]

## 2021-03-31 ENCOUNTER — APPOINTMENT (OUTPATIENT)
Dept: GENERAL RADIOLOGY | Age: 46
End: 2021-03-31
Payer: COMMERCIAL

## 2021-03-31 ENCOUNTER — HOSPITAL ENCOUNTER (EMERGENCY)
Age: 46
Discharge: HOME | End: 2021-03-31
Attending: EMERGENCY MEDICINE
Payer: COMMERCIAL

## 2021-03-31 VITALS
WEIGHT: 298 LBS | TEMPERATURE: 98.2 F | HEART RATE: 84 BPM | OXYGEN SATURATION: 99 % | BODY MASS INDEX: 38.24 KG/M2 | HEIGHT: 74 IN | DIASTOLIC BLOOD PRESSURE: 100 MMHG | SYSTOLIC BLOOD PRESSURE: 150 MMHG | RESPIRATION RATE: 18 BRPM

## 2021-03-31 DIAGNOSIS — S62.640B OPEN NONDISPLACED FRACTURE OF PROXIMAL PHALANX OF RIGHT INDEX FINGER, INITIAL ENCOUNTER: Primary | ICD-10-CM

## 2021-03-31 DIAGNOSIS — S61.411A LACERATION OF RIGHT HAND WITHOUT FOREIGN BODY, INITIAL ENCOUNTER: ICD-10-CM

## 2021-03-31 PROCEDURE — 90471 IMMUNIZATION ADMIN: CPT | Performed by: EMERGENCY MEDICINE

## 2021-03-31 PROCEDURE — 73130 X-RAY EXAM OF HAND: CPT

## 2021-03-31 PROCEDURE — 90715 TDAP VACCINE 7 YRS/> IM: CPT | Performed by: EMERGENCY MEDICINE

## 2021-03-31 PROCEDURE — 99283 EMERGENCY DEPT VISIT LOW MDM: CPT

## 2021-03-31 PROCEDURE — 29130 APPL FINGER SPLINT STATIC: CPT

## 2021-03-31 PROCEDURE — 6360000002 HC RX W HCPCS: Performed by: EMERGENCY MEDICINE

## 2021-03-31 RX ORDER — AMOXICILLIN AND CLAVULANATE POTASSIUM 875; 125 MG/1; MG/1
1 TABLET, FILM COATED ORAL ONCE
Status: DISCONTINUED | OUTPATIENT
Start: 2021-03-31 | End: 2021-03-31

## 2021-03-31 RX ORDER — AMOXICILLIN AND CLAVULANATE POTASSIUM 875; 125 MG/1; MG/1
1 TABLET, FILM COATED ORAL 2 TIMES DAILY
Qty: 14 TABLET | Refills: 0 | Status: SHIPPED | OUTPATIENT
Start: 2021-03-31 | End: 2021-04-07

## 2021-03-31 RX ADMIN — TETANUS TOXOID, REDUCED DIPHTHERIA TOXOID AND ACELLULAR PERTUSSIS VACCINE, ADSORBED 0.5 ML: 5; 2.5; 8; 8; 2.5 SUSPENSION INTRAMUSCULAR at 13:08

## 2021-03-31 NOTE — ED PROVIDER NOTES
R-0Normal      acetaminophen (TYLENOL) 500 MG tablet Take 1 tablet by mouth every 4 hours as needed for Pain or Fever, Disp-20 tablet, R-0Normal      Respiratory Therapy Supplies (NEBULIZER COMPRESSOR) KIT ONCE Starting Sat 11/2/2019, For 1 dose, Disp-1 kit, R-0, Print      naproxen (NAPROSYN) 500 MG tablet Take 1 tablet by mouth 2 times daily, Disp-60 tablet, R-0Print      allopurinol (ZYLOPRIM) 100 MG tablet Take 1 tablet by mouth daily, Disp-30 tablet, R-3      oxymetazoline (AFRIN 12 HOUR) 0.05 % nasal spray 2 sprays by Nasal route 2 times daily. !! - Potential duplicate medications found. Please discuss with provider.           ALLERGIES     Shellfish-derived products    FAMILY HISTORY       Family History   Problem Relation Age of Onset    Diabetes Mother     High Blood Pressure Mother     Heart Disease Mother     Diabetes Father     High Blood Pressure Father           SOCIAL HISTORY       Social History     Socioeconomic History    Marital status:      Spouse name: None    Number of children: None    Years of education: None    Highest education level: None   Occupational History    Occupation: maintenance     Employer: [address] [place] [address]   Social Needs    Financial resource strain: None    Food insecurity     Worry: None     Inability: None    Transportation needs     Medical: None     Non-medical: None   Tobacco Use    Smoking status: Current Every Day Smoker     Packs/day: 1.00     Types: Cigarettes    Smokeless tobacco: Never Used   Substance and Sexual Activity    Alcohol use: Not Currently    Drug use: No    Sexual activity: None   Lifestyle    Physical activity     Days per week: None     Minutes per session: None    Stress: None   Relationships    Social connections     Talks on phone: None     Gets together: None     Attends Christianity service: None     Active member of club or organization: None     Attends meetings of clubs or organizations: None Relationship status: None    Intimate partner violence     Fear of current or ex partner: None     Emotionally abused: None     Physically abused: None     Forced sexual activity: None   Other Topics Concern    None   Social History Narrative    None       SCREENINGS                        PHYSICAL EXAM    (up to 7 for level 4, 8 or more for level 5)     ED Triage Vitals [03/31/21 1226]   BP Temp Temp Source Pulse Resp SpO2 Height Weight   (!) 150/100 98.2 °F (36.8 °C) Oral 84 18 99 % 6' 2\" (1.88 m) 298 lb (135.2 kg)       Physical Exam  Vitals signs and nursing note reviewed. Constitutional:       General: He is not in acute distress. Appearance: He is well-developed. He is not diaphoretic. HENT:      Head: Normocephalic. Mouth/Throat:      Pharynx: No oropharyngeal exudate. Eyes:      General:         Right eye: No discharge. Left eye: No discharge. Pupils: Pupils are equal, round, and reactive to light. Neck:      Musculoskeletal: Neck supple. Trachea: No tracheal deviation. Cardiovascular:      Rate and Rhythm: Normal rate and regular rhythm. Heart sounds: Normal heart sounds. No murmur. Pulmonary:      Effort: Pulmonary effort is normal. No respiratory distress. Breath sounds: Normal breath sounds. No stridor. No wheezing or rales. Abdominal:      General: Bowel sounds are normal. There is no distension. Palpations: Abdomen is soft. Tenderness: There is no abdominal tenderness. There is no guarding or rebound. Musculoskeletal:      Right hand: He exhibits tenderness (proximal 2nd digit) and laceration. He exhibits normal range of motion and no deformity. Hands:    Skin:     General: Skin is warm and dry. Capillary Refill: Capillary refill takes less than 2 seconds. Findings: No erythema or rash. Neurological:      Mental Status: He is alert and oriented to person, place, and time. Cranial Nerves: No cranial nerve deficit. Sensory: No sensory deficit. Motor: No abnormal muscle tone. Coordination: Coordination normal.   Psychiatric:         Behavior: Behavior normal.         Thought Content: Thought content normal.         Judgment: Judgment normal.         DIAGNOSTIC RESULTS     EKG: All EKG's are interpreted by the Emergency Department Physician who either signs or Co-signs this chart in the absence of a cardiologist.      RADIOLOGY:   Non-plain film images such as CT, Ultrasound and MRI are read by the radiologist. Plain radiographic images are visualized and preliminarily interpreted by the emergency physician with the below findings:      Interpretation per the Radiologist below, if available at the time of this note:    XR HAND RIGHT (MIN 3 VIEWS)   Final Result   Nondisplaced, nonangulated comminuted fracture of the second proximal phalanx. **This report has been created using voice recognition software. It may contain minor errors which are inherent in voice recognition technology. **      Final report electronically signed by Dr. Carlos Gibbs MD on 3/31/2021 1:02 PM            ED BEDSIDE ULTRASOUND:   Performed by ED Physician - none    LABS:  Labs Reviewed - No data to display     All other labs were within normal range or not returned as of this dictation. EMERGENCY DEPARTMENT COURSE and DIFFERENTIAL DIAGNOSIS/MDM:   Vitals:    Vitals:    03/31/21 1226   BP: (!) 150/100   Pulse: 84   Resp: 18   Temp: 98.2 °F (36.8 °C)   TempSrc: Oral   SpO2: 99%   Weight: 298 lb (135.2 kg)   Height: 6' 2\" (1.88 m)       MDM  Number of Diagnoses or Management Options  Laceration of right hand without foreign body, initial encounter  Open nondisplaced fracture of proximal phalanx of right index finger, initial encounter  Diagnosis management comments: Patient is a 44-year-old male who presents for evaluation of injury to the right index finger.   There is tenderness to the proximal digit in addition to a 1 cm laceration. Patient has full range of motion of the digit. Sensation is intact. X-ray demonstrates fracture of the 2nd proximal phalanx without angulation or displacement. The Fracture is comminuted. PT placed in finger splint by RN. Laceration was repaired using dermabond. Pt tolerated procedure well. Discussed augmentin as outpatient and follow up with ortho next day. While technically this is an open fracture, I discussed the patient with ortho and they recommended antibiotics and outpatient follow up. Appt was scheduled in the ED and conveyed to the patient. REASSESSMENT     ED Course as of Apr 01 0957   Wed Mar 31, 2021   1343 Discussed pt with Mathew Hodges at [facility]. She recommends antibiotics and orthopedic follow-up in 1 to 2 days. Additionally discussed splinting the patient's finger.    [FK]      ED Course User Index  [FK] Kayli San MD         CRITICAL CARE TIME     CONSULTS:  None    PROCEDURES:  Unless otherwise noted below, none     Procedures      FINAL IMPRESSION      1. Open nondisplaced fracture of proximal phalanx of right index finger, initial encounter    2. Laceration of right hand without foreign body, initial encounter          DISPOSITION/PLAN   DISPOSITION Decision To Discharge 03/31/2021 01:39:44 PM      PATIENT REFERRED TO:  Darlyn Klein [address]  [address] [facility] [phone]  Go on 4/1/2021  Your appointment is at 12 PM tomorrow. Please go to your appointment. DISCHARGE MEDICATIONS:  Discharge Medication List as of 3/31/2021  1:43 PM      START taking these medications    Details   amoxicillin-clavulanate (AUGMENTIN) 875-125 MG per tablet Take 1 tablet by mouth 2 times daily for 7 days, Disp-14 tablet, R-0Print           Controlled Substances Monitoring:     No flowsheet data found.     (Please note that portions of this note were completed with a voice recognition program.  Efforts were made to edit the dictations but occasionally words are mis-transcribed.)    Rivera MD (electronically signed)  Attending Emergency Physician           Ivon Mohamud MD  04/01/21 1000

## 2021-11-27 ENCOUNTER — HOSPITAL ENCOUNTER (EMERGENCY)
Age: 46
Discharge: HOME | End: 2021-11-27
Payer: OTHER GOVERNMENT

## 2021-11-27 VITALS
HEART RATE: 83 BPM | BODY MASS INDEX: 38.24 KG/M2 | WEIGHT: 298 LBS | RESPIRATION RATE: 20 BRPM | SYSTOLIC BLOOD PRESSURE: 140 MMHG | OXYGEN SATURATION: 98 % | TEMPERATURE: 97 F | HEIGHT: 74 IN | DIASTOLIC BLOOD PRESSURE: 88 MMHG

## 2021-11-27 DIAGNOSIS — M10.032 ACUTE IDIOPATHIC GOUT OF LEFT WRIST: ICD-10-CM

## 2021-11-27 DIAGNOSIS — J01.40 ACUTE NON-RECURRENT PANSINUSITIS: Primary | ICD-10-CM

## 2021-11-27 PROCEDURE — 99213 OFFICE O/P EST LOW 20 MIN: CPT

## 2021-11-27 PROCEDURE — 99213 OFFICE O/P EST LOW 20 MIN: CPT | Performed by: NURSE PRACTITIONER

## 2021-11-27 RX ORDER — INDOMETHACIN 50 MG/1
50 CAPSULE ORAL 2 TIMES DAILY WITH MEALS
Qty: 14 CAPSULE | Refills: 0 | Status: SHIPPED | OUTPATIENT
Start: 2021-11-27 | End: 2021-12-04

## 2021-11-27 RX ORDER — CEFDINIR 300 MG/1
300 CAPSULE ORAL 2 TIMES DAILY
Qty: 20 CAPSULE | Refills: 0 | Status: SHIPPED | OUTPATIENT
Start: 2021-11-27 | End: 2021-12-07

## 2021-11-27 ASSESSMENT — PAIN - FUNCTIONAL ASSESSMENT: PAIN_FUNCTIONAL_ASSESSMENT: PREVENTS OR INTERFERES SOME ACTIVE ACTIVITIES AND ADLS

## 2021-11-27 ASSESSMENT — ENCOUNTER SYMPTOMS
SORE THROAT: 0
COUGH: 1
SINUS PRESSURE: 1
GASTROINTESTINAL NEGATIVE: 1
SINUS PAIN: 1

## 2021-11-27 ASSESSMENT — PAIN SCALES - GENERAL: PAINLEVEL_OUTOF10: 7

## 2021-11-27 ASSESSMENT — PAIN DESCRIPTION - PAIN TYPE: TYPE: ACUTE PAIN

## 2021-11-27 ASSESSMENT — PAIN DESCRIPTION - DESCRIPTORS: DESCRIPTORS: OTHER (COMMENT)

## 2021-11-27 NOTE — ED PROVIDER NOTES
[facility]  Urgent Care Encounter       CHIEF COMPLAINT       Chief Complaint   Patient presents with    Nasal Congestion     sinus pressure    Cough       Nurses Notes reviewed and I agree except as noted in the HPI. HISTORY OF PRESENT ILLNESS   Randa Rao is a 55 y.o. male who presents urgent care with facial pain, congestion, cough, fever. Onset of symptoms was 1 week. Denies sore throat ear pain. Loss of smell patient had a rapid test done through work and was negative. Is currently having a gout flareup in the left wrist patient currently takes allopurinol does not seem to be effective. REVIEW OF SYSTEMS     Review of Systems   Constitutional: Positive for fever. HENT: Positive for congestion, sinus pressure and sinus pain. Negative for ear pain and sore throat. Respiratory: Positive for cough. Gastrointestinal: Negative. Musculoskeletal:        Left wrist pain, limited ROM    Neurological: Negative. PAST MEDICAL HISTORY         Diagnosis Date    Gout     Migraine        SURGICALHISTORY     Patient  has a past surgical history that includes fracture surgery; Testicle torsion reduction; and Nose surgery. CURRENT MEDICATIONS       Previous Medications    ACETAMINOPHEN (TYLENOL) 500 MG TABLET    Take 1 tablet by mouth every 4 hours as needed for Pain or Fever    ALBUTEROL SULFATE HFA (VENTOLIN HFA) 108 (90 BASE) MCG/ACT INHALER    Inhale 2 puffs into the lungs 4 times daily as needed for Wheezing    ALBUTEROL SULFATE  (90 BASE) MCG/ACT INHALER    Inhale 2 puffs into the lungs every 4 hours as needed for Wheezing or Shortness of Breath    ALLOPURINOL (ZYLOPRIM) 100 MG TABLET    Take 1 tablet by mouth daily    KETOROLAC (TORADOL) 10 MG TABLET    Take 1 tablet by mouth every 6 hours as needed for Pain    OXYMETAZOLINE (AFRIN 12 HOUR) 0.05 % NASAL SPRAY    2 sprays by Nasal route 2 times daily.     RESPIRATORY THERAPY SUPPLIES (NEBULIZER COMPRESSOR) KIT    1 kit by Does not apply route once for 1 dose       ALLERGIES     Patient is is allergic to shellfish-derived products. Patients   Immunization History   Administered Date(s) Administered    Influenza Virus Vaccine 11/08/2016    Tdap (Boostrix, Adacel) 11/22/2011, 03/31/2021       FAMILY HISTORY     Patient's family history includes Diabetes in his father and mother; Heart Disease in his mother; High Blood Pressure in his father and mother. SOCIAL HISTORY     Patient  reports that he has been smoking cigars. He has been smoking about 0.00 packs per day. He has never used smokeless tobacco. He reports previous alcohol use. He reports that he does not use drugs. PHYSICAL EXAM     ED TRIAGE VITALS  BP: (!) 140/88, Temp: 97 °F (36.1 °C), Pulse: 83, Resp: 20, SpO2: 98 %,Estimated body mass index is 38.26 kg/m² as calculated from the following:    Height as of this encounter: 6' 2\" (1.88 m). Weight as of this encounter: 298 lb (135.2 kg). ,No LMP for male patient. Physical Exam  Vitals reviewed. Constitutional:       General: He is not in acute distress. Appearance: He is obese. He is not ill-appearing. HENT:      Right Ear: Tympanic membrane and ear canal normal.      Left Ear: Tympanic membrane and ear canal normal.      Nose: Congestion present. Right Sinus: Maxillary sinus tenderness and frontal sinus tenderness present. Left Sinus: Maxillary sinus tenderness and frontal sinus tenderness present. Cardiovascular:      Rate and Rhythm: Normal rate. Pulses: Normal pulses. Pulmonary:      Effort: Pulmonary effort is normal.      Breath sounds: Normal breath sounds. Musculoskeletal:      Left wrist: Tenderness present. Decreased range of motion. Arms:    Neurological:      Mental Status: He is alert. DIAGNOSTIC RESULTS     Labs:No results found for this visit on 11/27/21.     IMAGING:    No orders to display         URGENT CARE COURSE: Vitals:    11/27/21 1018   BP: (!) 140/88   Pulse: 83   Resp: 20   Temp: 97 °F (36.1 °C)   TempSrc: Temporal   SpO2: 98%   Weight: 298 lb (135.2 kg)   Height: 6' 2\" (1.88 m)       Medications - No data to display         PROCEDURES:  None    FINAL IMPRESSION      1. Acute non-recurrent pansinusitis    2. Acute idiopathic gout of left wrist          DISPOSITION/ PLAN   Discharge home, patient medically stable. Discussed treatment plan. Patient verbalized understanding no questions at this time. Push fluids  Take medications as prescribed  Follow up with PCP in one week   Go to ER if symptoms worsen       PATIENT REFERRED TO:  No primary care provider on file. No primary physician on file.       DISCHARGE MEDICATIONS:  New Prescriptions    CEFDINIR (OMNICEF) 300 MG CAPSULE    Take 1 capsule by mouth 2 times daily for 10 days    INDOMETHACIN (INDOCIN) 50 MG CAPSULE    Take 1 capsule by mouth 2 times daily (with meals) for 7 days       Discontinued Medications    NAPROXEN (NAPROSYN) 500 MG TABLET    Take 1 tablet by mouth 2 times daily       Current Discharge Medication List      CONTINUE these medications which have CHANGED    Details   indomethacin (INDOCIN) 50 MG capsule Take 1 capsule by mouth 2 times daily (with meals) for 7 days  Qty: 14 capsule, Refills: 0             NEREYDA Davalos CNP    (Please note that portions of this note were completed with a voice recognition program. Efforts were made to edit the dictations but occasionally words are mis-transcribed.)           NEREYDA Davalos CNP  11/27/21 Rain 22, APRN - CNP  11/27/21 1120

## 2021-11-27 NOTE — ED TRIAGE NOTES
Patient ambulated to room with complaint of sinus pressure, nasal congestion and cough. Also states he is having pain from gout in wrists.

## 2021-12-28 ENCOUNTER — APPOINTMENT (OUTPATIENT)
Dept: GENERAL RADIOLOGY | Age: 46
End: 2021-12-28
Payer: OTHER GOVERNMENT

## 2021-12-28 ENCOUNTER — HOSPITAL ENCOUNTER (EMERGENCY)
Age: 46
Discharge: HOME | End: 2021-12-28
Attending: EMERGENCY MEDICINE
Payer: OTHER GOVERNMENT

## 2021-12-28 VITALS
WEIGHT: 294 LBS | OXYGEN SATURATION: 99 % | DIASTOLIC BLOOD PRESSURE: 91 MMHG | HEIGHT: 74 IN | SYSTOLIC BLOOD PRESSURE: 155 MMHG | HEART RATE: 95 BPM | TEMPERATURE: 97.9 F | RESPIRATION RATE: 18 BRPM | BODY MASS INDEX: 37.73 KG/M2

## 2021-12-28 DIAGNOSIS — M10.9 ACUTE GOUT OF LEFT HAND, UNSPECIFIED CAUSE: Primary | ICD-10-CM

## 2021-12-28 PROCEDURE — 6360000002 HC RX W HCPCS: Performed by: STUDENT IN AN ORGANIZED HEALTH CARE EDUCATION/TRAINING PROGRAM

## 2021-12-28 PROCEDURE — 99283 EMERGENCY DEPT VISIT LOW MDM: CPT

## 2021-12-28 PROCEDURE — 96372 THER/PROPH/DIAG INJ SC/IM: CPT

## 2021-12-28 PROCEDURE — 6370000000 HC RX 637 (ALT 250 FOR IP): Performed by: STUDENT IN AN ORGANIZED HEALTH CARE EDUCATION/TRAINING PROGRAM

## 2021-12-28 PROCEDURE — 73130 X-RAY EXAM OF HAND: CPT

## 2021-12-28 PROCEDURE — 73110 X-RAY EXAM OF WRIST: CPT

## 2021-12-28 RX ORDER — KETOROLAC TROMETHAMINE 30 MG/ML
15 INJECTION, SOLUTION INTRAMUSCULAR; INTRAVENOUS ONCE
Status: COMPLETED | OUTPATIENT
Start: 2021-12-28 | End: 2021-12-28

## 2021-12-28 RX ORDER — NAPROXEN 500 MG/1
500 TABLET ORAL 2 TIMES DAILY PRN
Qty: 14 TABLET | Refills: 0 | Status: SHIPPED | OUTPATIENT
Start: 2021-12-28 | End: 2022-01-04

## 2021-12-28 RX ORDER — PREDNISONE 20 MG/1
40 TABLET ORAL DAILY
Qty: 6 TABLET | Refills: 0 | Status: SHIPPED | OUTPATIENT
Start: 2021-12-31 | End: 2022-01-03

## 2021-12-28 RX ORDER — PREDNISONE 20 MG/1
10 TABLET ORAL DAILY
Qty: 1 TABLET | Refills: 0 | Status: SHIPPED | OUTPATIENT
Start: 2022-01-06 | End: 2022-01-08

## 2021-12-28 RX ORDER — PREDNISONE 20 MG/1
30 TABLET ORAL DAILY
Qty: 3 TABLET | Refills: 0 | Status: SHIPPED | OUTPATIENT
Start: 2022-01-03 | End: 2022-01-05

## 2021-12-28 RX ORDER — PREDNISONE 20 MG/1
10 TABLET ORAL DAILY
Qty: 0.5 TABLET | Refills: 0 | Status: SHIPPED | OUTPATIENT
Start: 2022-01-07 | End: 2022-01-08

## 2021-12-28 RX ORDER — ACETAMINOPHEN 325 MG/1
650 TABLET ORAL ONCE
Status: COMPLETED | OUTPATIENT
Start: 2021-12-28 | End: 2021-12-28

## 2021-12-28 RX ORDER — PREDNISONE 20 MG/1
50 TABLET ORAL DAILY
Qty: 5 TABLET | Refills: 0 | Status: SHIPPED | OUTPATIENT
Start: 2021-12-29 | End: 2021-12-31

## 2021-12-28 RX ADMIN — KETOROLAC TROMETHAMINE 15 MG: 30 INJECTION, SOLUTION INTRAMUSCULAR; INTRAVENOUS at 15:54

## 2021-12-28 RX ADMIN — ACETAMINOPHEN 650 MG: 325 TABLET ORAL at 15:55

## 2021-12-28 RX ADMIN — PREDNISONE 50 MG: 10 TABLET ORAL at 15:54

## 2021-12-28 ASSESSMENT — ENCOUNTER SYMPTOMS
DIARRHEA: 0
EYE PAIN: 0
NAUSEA: 0
BACK PAIN: 0
SHORTNESS OF BREATH: 0
SINUS PAIN: 0
COUGH: 0
ABDOMINAL PAIN: 0
CONSTIPATION: 0
VOMITING: 0

## 2021-12-28 ASSESSMENT — PAIN SCALES - GENERAL: PAINLEVEL_OUTOF10: 7

## 2021-12-28 NOTE — ED PROVIDER NOTES
[facility] ENCOUNTER          Pt Name: Elvia Butler  MRN: [id number]  [place] 1975  Date of evaluation: 12/28/2021  Treating Resident Physician: John Pop MD  Supervising Physician: Dr. Omar Bertrand MD    [address] [place] [address]       Chief Complaint   Patient presents with    Gout     pain in hands     History obtained from the patient. HISTORY OF PRESENT ILLNESS    HPI  Elvia Butler is a 55 y.o. male who presents to the emergency department for evaluation of left hand pain. Patient states he has a history of gout and has had flareups in his foot as well as his left hand and wrist.  He states that for the past month he has been having left hand and left wrist pain. He initially was treated with medications with some improvement in his symptoms but it has continued with eczema. He states he takes allopurinol and has taken steroids in the past.  He denies any falls or trauma. Denies any headache fever chills. Denies any specific inciting events. He states this is similar in quality to previous gout flareups. He states he is right-handed. He states that there is some swelling and pain with movement of his left hand. Denies any nausea or vomiting. Denies any cough chest pain shortness of breath abdominal pain nausea vomiting diarrhea constipation leg swelling. Dates he did not take any medications today other than his albuterol. Patient denies any new headache fever chills cough chest pain shortness of breath abdominal pain nausea vomiting diarrhea constipation leg swelling. The patient has no other acute complaints at this time. REVIEW OF SYSTEMS   Review of Systems   Constitutional: Negative for chills and fever. HENT: Negative for ear pain and sinus pain. Eyes: Negative for pain. Respiratory: Negative for cough and shortness of breath. Cardiovascular: Negative for chest pain and leg swelling. Gastrointestinal: Negative for abdominal pain, constipation, diarrhea, nausea and vomiting. Genitourinary: Negative for dysuria and flank pain. Musculoskeletal: Positive for arthralgias and joint swelling. Negative for back pain and neck pain. Skin: Negative for wound. Neurological: Negative for headaches. Psychiatric/Behavioral: Negative for confusion. PAST MEDICAL AND SURGICAL HISTORY     Past Medical History:   Diagnosis Date    Gout     Migraine      Past Surgical History:   Procedure Laterality Date    FRACTURE SURGERY      nose    NOSE SURGERY      TESTICLE TORSION REDUCTION           MEDICATIONS   No current facility-administered medications for this encounter.     Current Outpatient Medications:     naproxen (NAPROSYN) 500 MG tablet, Take 1 tablet by mouth 2 times daily as needed for Pain, Disp: 14 tablet, Rfl: 0    [START ON 12/29/2021] predniSONE (DELTASONE) 20 MG tablet, Take 2.5 tablets by mouth daily for 2 days, Disp: 5 tablet, Rfl: 0    [START ON 12/31/2021] predniSONE (DELTASONE) 20 MG tablet, Take 2 tablets by mouth daily for 3 days, Disp: 6 tablet, Rfl: 0    [START ON 1/3/2022] predniSONE (DELTASONE) 20 MG tablet, Take 1.5 tablets by mouth daily for 2 days, Disp: 3 tablet, Rfl: 0    [START ON 1/6/2022] predniSONE (DELTASONE) 20 MG tablet, Take 0.5 tablets by mouth daily for 2 days, Disp: 1 tablet, Rfl: 0    [START ON 1/7/2022] predniSONE (DELTASONE) 20 MG tablet, Take 0.5 tablets by mouth daily for 1 day, Disp: 0.5 tablet, Rfl: 0    indomethacin (INDOCIN) 50 MG capsule, Take 1 capsule by mouth 2 times daily (with meals) for 7 days, Disp: 14 capsule, Rfl: 0    albuterol sulfate HFA (VENTOLIN HFA) 108 (90 Base) MCG/ACT inhaler, Inhale 2 puffs into the lungs 4 times daily as needed for Wheezing, Disp: 1 Inhaler, Rfl: 0    ketorolac (TORADOL) 10 MG tablet, Take 1 tablet by mouth every 6 hours as needed for Pain, Disp: 20 tablet, Rfl: 0    albuterol sulfate  (90 Base) MCG/ACT inhaler, Inhale 2 puffs into the lungs every 4 hours as needed for Wheezing or Shortness of Breath, Disp: 1 Inhaler, Rfl: 0    acetaminophen (TYLENOL) 500 MG tablet, Take 1 tablet by mouth every 4 hours as needed for Pain or Fever, Disp: 20 tablet, Rfl: 0    Respiratory Therapy Supplies (NEBULIZER COMPRESSOR) KIT, 1 kit by Does not apply route once for 1 dose, Disp: 1 kit, Rfl: 0    allopurinol (ZYLOPRIM) 100 MG tablet, Take 1 tablet by mouth daily (Patient taking differently: Take 100 mg by mouth daily as needed ), Disp: 30 tablet, Rfl: 3    oxymetazoline (AFRIN 12 HOUR) 0.05 % nasal spray, 2 sprays by Nasal route 2 times daily. , Disp: , Rfl:       SOCIAL HISTORY     Social History     Social History Narrative    Not on file     Social History     Tobacco Use    Smoking status: Current Some Day Smoker     Packs/day: 0.00     Types: Cigars    Smokeless tobacco: Never Used   Vaping Use    Vaping Use: Never used   Substance Use Topics    Alcohol use: Not Currently    Drug use: No         ALLERGIES     Allergies   Allergen Reactions    Shellfish-Derived Products Itching         FAMILY HISTORY     Family History   Problem Relation Age of Onset    Diabetes Mother     High Blood Pressure Mother     Heart Disease Mother     Diabetes Father     High Blood Pressure Father          PREVIOUS RECORDS   Previous records reviewed: Patient was seen last on 11/27/2021 for pansinusitis. PHYSICAL EXAM     ED Triage Vitals   BP Temp Temp src Pulse Resp SpO2 Height Weight   -- -- -- -- -- -- -- --     Initial vital signs and nursing assessment reviewed and vitals are/show: abnormal from hypertensive. Pulsoximetry is normal per my interpretation. Additional Vital Signs:  Vitals:    12/28/21 1516   BP: (!) 155/91   Pulse: 95   Resp: 18   Temp: 97.9 °F (36.6 °C)   SpO2: 99%       Physical Exam  Constitutional:       General: He is not in acute distress. Appearance: Normal appearance.  He is not ill-appearing, toxic-appearing or diaphoretic. HENT:      Head: Normocephalic and atraumatic. Right Ear: External ear normal.      Left Ear: External ear normal.   Eyes:      General: No scleral icterus. Right eye: No discharge. Left eye: No discharge. Cardiovascular:      Rate and Rhythm: Normal rate and regular rhythm. Pulmonary:      Effort: Pulmonary effort is normal. No respiratory distress. Breath sounds: Normal breath sounds. No stridor. No wheezing, rhonchi or rales. Chest:      Chest wall: No tenderness. Abdominal:      General: Abdomen is flat. There is no distension. Palpations: Abdomen is soft. Tenderness: There is no abdominal tenderness. There is no guarding or rebound. Musculoskeletal:         General: Tenderness present. Cervical back: Neck supple. Right lower leg: No edema. Left lower leg: No edema. Comments: Tenderness palpation over left fifth dorsal MCP with some mild edema without erythema laceration or abrasions  Tenderness to palpation over left distal ulna; mild edema no erythema laceration abrasion  No pain with passive motion  Range of motion limited due to pain   Skin:     General: Skin is warm and dry. Neurological:      Mental Status: He is alert and oriented to person, place, and time. Mental status is at baseline. Psychiatric:         Mood and Affect: Mood normal.         Behavior: Behavior normal.         Thought Content:  Thought content normal.         Judgment: Judgment normal.             MEDICAL DECISION MAKING   Initial Assessment:     79-year-old male with a history of gout presenting to the ED for right hand and right wrist pain    Differential diagnoses include but not limited to: Gout pseudogout arthritis inflammatory arthritis septic joint flexor tenosynovitis carpal tunnel        Plan:       Imaging: XR Wrist and hand  Tylenol  Toradol  Prednisone   Discharge          Patient is a 55 y.o. male who was seen and evaluated in the emergency department for recent gout flareup. Patient has a history of gout and his symptoms seemed consistent with a gout flare. He has been taking his allopurinol for his gout during this current flareup likely exacerbating the problem. I did image his hand and wrist which showed no acute new significant pathology. I provided Tylenol and Toradol and prednisone. I counseled the patient and educated him on the use of allopurinol. I discharged him with a steroid taper in addition to NSAIDs. Patient's vital signs are stable in the ED. Patient was neurovascularly intact. Patient discharged. ED RESULTS   Laboratory results:  Labs Reviewed - No data to display    Radiologic studies results:  XR HAND LEFT (MIN 3 VIEWS)   Final Result      Mild periarticular osteopenia. Mild to moderate chronic arthritic changes most pronounced at the radiocarpal and first CMC joints. No fracture or acute bony abnormality noted in the left hand and wrist.         **This report has been created using voice recognition software. It may contain minor errors which are inherent in voice recognition technology. **      Final report electronically signed by Dr Kera Gross on 12/28/2021 4:13 PM      XR WRIST LEFT (MIN 3 VIEWS)   Final Result      Mild periarticular osteopenia. Mild to moderate chronic arthritic changes most pronounced at the radiocarpal and first CMC joints. No fracture or acute bony abnormality noted in the left hand and wrist.         **This report has been created using voice recognition software. It may contain minor errors which are inherent in voice recognition technology. **      Final report electronically signed by Dr Kera Gross on 12/28/2021 4:13 PM          ED Medications administered this visit:   Medications   ketorolac (TORADOL) injection 15 mg (15 mg IntraMUSCular Given 12/28/21 3346)   acetaminophen (TYLENOL) tablet 650 mg (650 mg Oral Given 12/28/21 6835) predniSONE (DELTASONE) tablet 50 mg (50 mg Oral Given 12/28/21 3754)         ED COURSE     ED Course as of 12/28/21 1834   Tue Dec 28, 2021   1619 XR:IMPRESSION:     Mild periarticular osteopenia. Mild to moderate chronic arthritic changes most pronounced at the radiocarpal and first CMC joints. No fracture or acute bony abnormality noted in the left hand and wrist. [CR]      ED Course User Index  [CR] John Pop MD        Strict return precautions and follow up instructions were discussed with the patient prior to discharge, with which the patient agrees. MEDICATION CHANGES     Discharge Medication List as of 12/28/2021  5:16 PM      START taking these medications    Details   naproxen (NAPROSYN) 500 MG tablet Take 1 tablet by mouth 2 times daily as needed for Pain, Disp-14 tablet, R-0Normal      !! predniSONE (DELTASONE) 20 MG tablet Take 2.5 tablets by mouth daily for 2 days, Disp-5 tablet, R-0Normal      !! predniSONE (DELTASONE) 20 MG tablet Take 2 tablets by mouth daily for 3 days, Disp-6 tablet, R-0Normal      !! predniSONE (DELTASONE) 20 MG tablet Take 1.5 tablets by mouth daily for 2 days, Disp-3 tablet, R-0Normal      !! predniSONE (DELTASONE) 20 MG tablet Take 0.5 tablets by mouth daily for 2 days, Disp-1 tablet, R-0Normal      !! predniSONE (DELTASONE) 20 MG tablet Take 0.5 tablets by mouth daily for 1 day, Disp-0.5 tablet, R-0Normal       !! - Potential duplicate medications found. Please discuss with provider. FINAL DISPOSITION     Final diagnoses:   Acute gout of left hand, unspecified cause     Condition: condition: stable  Dispo: Discharge to home       This transcription was electronically signed. Parts of this transcriptions may have been dictated by use of voice recognition software and electronically transcribed, and parts may have been transcribed with the assistance of an ED scribe. The transcription may contain errors not detected in proofreading.   Please refer to my supervising physician's documentation if my documentation differs.     Electronically Signed: Edward Rios MD, 12/28/21, 6:34 PM         Edward Rios MD  Resident  12/28/21 3626

## 2021-12-28 NOTE — ED NOTES
Pt to the ED via self. Patient presents with complaints of hand pain. Patient states that his PCP at the [address] told him his uric acid levels were high and that he needed to come get an xray. Patient is alert and oriented x 4. Respirations are regular and unlabored. Call light within reach.      Dilcia Tmeple RN  12/28/21 7938

## 2022-01-02 ENCOUNTER — HOSPITAL ENCOUNTER (EMERGENCY)
Age: 47
Discharge: HOME | End: 2022-01-02
Payer: OTHER GOVERNMENT

## 2022-01-02 VITALS
WEIGHT: 294 LBS | OXYGEN SATURATION: 95 % | HEIGHT: 74 IN | TEMPERATURE: 98 F | HEART RATE: 93 BPM | DIASTOLIC BLOOD PRESSURE: 78 MMHG | BODY MASS INDEX: 37.73 KG/M2 | SYSTOLIC BLOOD PRESSURE: 137 MMHG | RESPIRATION RATE: 16 BRPM

## 2022-01-02 DIAGNOSIS — Z20.822 SUSPECTED COVID-19 VIRUS INFECTION: Primary | ICD-10-CM

## 2022-01-02 LAB
INFLUENZA A: NOT DETECTED
INFLUENZA B: NOT DETECTED
SARS-COV-2 RNA, RT PCR: DETECTED

## 2022-01-02 PROCEDURE — 99213 OFFICE O/P EST LOW 20 MIN: CPT | Performed by: NURSE PRACTITIONER

## 2022-01-02 PROCEDURE — 87636 SARSCOV2 & INF A&B AMP PRB: CPT

## 2022-01-02 PROCEDURE — 99213 OFFICE O/P EST LOW 20 MIN: CPT

## 2022-01-02 RX ORDER — BENZONATATE 200 MG/1
200 CAPSULE ORAL 3 TIMES DAILY PRN
Qty: 21 CAPSULE | Refills: 0 | Status: SHIPPED | OUTPATIENT
Start: 2022-01-02 | End: 2022-01-09

## 2022-01-02 RX ORDER — AZITHROMYCIN 250 MG/1
TABLET, FILM COATED ORAL
Qty: 1 PACKET | Refills: 0 | Status: SHIPPED | OUTPATIENT
Start: 2022-01-02 | End: 2022-05-13

## 2022-01-02 RX ORDER — DEXTROMETHORPHAN HYDROBROMIDE AND PROMETHAZINE HYDROCHLORIDE 15; 6.25 MG/5ML; MG/5ML
5 SYRUP ORAL 4 TIMES DAILY PRN
Qty: 118 ML | Refills: 0 | Status: SHIPPED | OUTPATIENT
Start: 2022-01-02 | End: 2022-01-09

## 2022-01-02 RX ORDER — ALBUTEROL SULFATE 90 UG/1
2 AEROSOL, METERED RESPIRATORY (INHALATION) EVERY 4 HOURS PRN
Qty: 18 G | Refills: 0 | Status: SHIPPED | OUTPATIENT
Start: 2022-01-02

## 2022-01-02 ASSESSMENT — PAIN DESCRIPTION - LOCATION: LOCATION: HEAD

## 2022-01-02 ASSESSMENT — ENCOUNTER SYMPTOMS
COUGH: 1
SINUS PRESSURE: 1
SORE THROAT: 1
CHEST TIGHTNESS: 1
SHORTNESS OF BREATH: 0
NAUSEA: 0
DIARRHEA: 0
VOMITING: 0

## 2022-01-02 ASSESSMENT — PAIN SCALES - GENERAL: PAINLEVEL_OUTOF10: 7

## 2022-01-02 ASSESSMENT — PAIN DESCRIPTION - DESCRIPTORS: DESCRIPTORS: ACHING

## 2022-01-02 ASSESSMENT — PAIN DESCRIPTION - FREQUENCY: FREQUENCY: INTERMITTENT

## 2022-01-02 ASSESSMENT — PAIN DESCRIPTION - PAIN TYPE: TYPE: ACUTE PAIN

## 2022-01-02 NOTE — ED TRIAGE NOTES
Pt c/o sinus congestion, loss of taste and smell and cough. Pt did take covid home test which was negative.

## 2022-01-02 NOTE — Clinical Note
Tania Crawley was seen and treated in our emergency department on 1/2/2022. COVID19 virus is suspected. Per the CDC guidelines we recommend home isolation until the following conditions are all met:    1. At least 10 days have passed since symptoms first appeared and  2. At least 24 hours have passed since last fever without the use of fever-reducing medications and  3. Symptoms (e.g., cough, shortness of breath) have improved    If you have any questions or concerns, please don't hesitate to call.     He may return to work/school on 01/12/2022        NEREYDA Martinez - CNP

## 2022-01-02 NOTE — Clinical Note
Ana Elizabeth was seen and treated in our emergency department on 1/2/2022. He may return to work on 01/05/2022. He may return pending a negative Covid test.     If you have any questions or concerns, please don't hesitate to call.       Juanita Bryant, NEREYDA - CNP

## 2022-01-02 NOTE — ED PROVIDER NOTES
[facility]       CHIEF COMPLAINT       Chief Complaint   Patient presents with    Sinus Problem     x 24 hours    Other     loss of taste and smell       Nurses Notes reviewed and I agree except as noted in the HPI. HISTORY OF PRESENT ILLNESS   Paola Ware is a 55 y.o. male who presents for evaluation of sinus pressure and congestion as well as loss of smell and taste. Patient states that the symptoms began yesterday. He denies any recent sick exposures but states that he was around his daughter who tested positive for Covid last week. States that he has had mild chest tightness but denies any significant shortness of breath. He states that he is currently taking prednisone for gout but denies any other medications or interventions specifically for the symptoms. The history is provided by the patient. REVIEW OF SYSTEMS     Review of Systems   Constitutional: Positive for chills. Negative for fever. HENT: Positive for congestion, sinus pressure and sore throat. Respiratory: Positive for cough and chest tightness. Negative for shortness of breath. Cardiovascular: Negative for chest pain. Gastrointestinal: Negative for diarrhea, nausea and vomiting. Musculoskeletal: Negative for arthralgias and myalgias. Skin: Negative for rash. Allergic/Immunologic: Negative for environmental allergies. Neurological: Positive for headaches. PAST MEDICAL HISTORY         Diagnosis Date    Gout     Migraine        SURGICALHISTORY     Patient  has a past surgical history that includes fracture surgery; Testicle torsion reduction; and Nose surgery.     CURRENT MEDICATIONS       Discharge Medication List as of 1/2/2022  6:08 PM      CONTINUE these medications which have NOT CHANGED    Details   ketorolac (TORADOL) 10 MG tablet Take 1 tablet by mouth every 6 hours as needed for Pain, Disp-20 tablet, R-0Print      naproxen (NAPROSYN) 500 MG tablet Take 1 tablet by mouth 2 times daily as needed for Pain, Disp-14 tablet, R-0Normal      !! predniSONE (DELTASONE) 20 MG tablet Take 2 tablets by mouth daily for 3 days, Disp-6 tablet, R-0Normal      !! predniSONE (DELTASONE) 20 MG tablet Take 1.5 tablets by mouth daily for 2 days, Disp-3 tablet, R-0Normal      !! predniSONE (DELTASONE) 20 MG tablet Take 0.5 tablets by mouth daily for 2 days, Disp-1 tablet, R-0Normal      !! predniSONE (DELTASONE) 20 MG tablet Take 0.5 tablets by mouth daily for 1 day, Disp-0.5 tablet, R-0Normal      indomethacin (INDOCIN) 50 MG capsule Take 1 capsule by mouth 2 times daily (with meals) for 7 days, Disp-14 capsule, R-0Normal      acetaminophen (TYLENOL) 500 MG tablet Take 1 tablet by mouth every 4 hours as needed for Pain or Fever, Disp-20 tablet, R-0Normal      Respiratory Therapy Supplies (NEBULIZER COMPRESSOR) KIT ONCE Starting Sat 11/2/2019, For 1 dose, Disp-1 kit, R-0, Print      allopurinol (ZYLOPRIM) 100 MG tablet Take 1 tablet by mouth daily, Disp-30 tablet, R-3      oxymetazoline (AFRIN 12 HOUR) 0.05 % nasal spray 2 sprays by Nasal route 2 times daily. !! - Potential duplicate medications found. Please discuss with provider. ALLERGIES     Patient is is allergic to shellfish-derived products. Patients   Immunization History   Administered Date(s) Administered    Influenza Virus Vaccine 11/08/2016    Tdap (Boostrix, Adacel) 11/22/2011, 03/31/2021       FAMILY HISTORY     Patient's family history includes Diabetes in his father and mother; Heart Disease in his mother; High Blood Pressure in his father and mother. SOCIAL HISTORY     Patient  reports that he has been smoking cigars. He has been smoking about 0.00 packs per day. He has never used smokeless tobacco. He reports previous alcohol use. He reports that he does not use drugs.     PHYSICAL EXAM     ED TRIAGE VITALS  BP: 137/78, Temp: 98 °F (36.7 °C), Pulse: 93, Resp: 16, SpO2: 95 %,Estimated body mass index is 37.75 kg/m² as calculated from the following:    Height as of this encounter: 6' 2\" (1.88 m). Weight as of this encounter: 294 lb (133.4 kg). ,No LMP for male patient. Physical Exam  Vitals and nursing note reviewed. Constitutional:       General: He is not in acute distress. Appearance: He is well-developed. He is not diaphoretic. HENT:      Right Ear: Tympanic membrane and ear canal normal.      Left Ear: Tympanic membrane and ear canal normal.      Mouth/Throat:      Mouth: Mucous membranes are moist.      Pharynx: Oropharynx is clear. Eyes:      Conjunctiva/sclera:      Right eye: Right conjunctiva is not injected. Left eye: Left conjunctiva is not injected. Pupils: Pupils are equal.   Cardiovascular:      Rate and Rhythm: Normal rate and regular rhythm. Heart sounds: No murmur heard. Pulmonary:      Effort: Pulmonary effort is normal. No respiratory distress. Breath sounds: Normal breath sounds. Musculoskeletal:      Cervical back: Normal range of motion. Right knee: Normal range of motion. Left knee: Normal range of motion. Skin:     General: Skin is warm. Findings: No rash. Neurological:      Mental Status: He is alert and oriented to person, place, and time. Psychiatric:         Behavior: Behavior normal.         DIAGNOSTIC RESULTS     Labs:No results found for this visit on 01/02/22. IMAGING:    No orders to display         EKG:      URGENT CARE COURSE:     Vitals:    01/02/22 1756   BP: 137/78   Pulse: 93   Resp: 16   Temp: 98 °F (36.7 °C)   TempSrc: Temporal   SpO2: 95%   Weight: 294 lb (133.4 kg)   Height: 6' 2\" (1.88 m)       Medications - No data to display         PROCEDURES:  None    FINAL IMPRESSION      1. Suspected COVID-19 virus infection          DISPOSITION/ PLAN     I discussed with the patient that symptoms are consistent with what is likely coronavirus.   He is advised to continue taking the prednisone that he is currently on for his gout and he is given a prescription for Tessalon Perles, Promethazine DM, and albuterol Hailer and azithromycin. He is advised to quarantine if he is positive for Covid and to rest and hydrate. He is instructed to rest and hydrate at home and present to the ER if symptoms worsen. He is agreeable to plan as discussed. PATIENT REFERRED TO:  Julio Baptiste MD  [id number] [facility] [place] / [place] [address]      DISCHARGE MEDICATIONS:  Discharge Medication List as of 1/2/2022  6:08 PM      START taking these medications    Details   benzonatate (TESSALON) 200 MG capsule Take 1 capsule by mouth 3 times daily as needed for Cough, Disp-21 capsule, R-0Normal      promethazine-dextromethorphan (PROMETHAZINE-DM) 6.25-15 MG/5ML syrup Take 5 mLs by mouth 4 times daily as needed for Cough Do not drive or operate heavy machinery while taking this medication. , Disp-118 mL, R-0Normal      azithromycin (ZITHROMAX Z-HODA) 250 MG tablet Take 2 tablets (500 mg) on Day 1, and then take 1 tablet (250 mg) on days 2 through 5., Disp-1 packet, R-0Normal             Discharge Medication List as of 1/2/2022  6:08 PM      STOP taking these medications       topiramate (TOPAMAX SPRINKLE) 25 MG capsule Comments:   Reason for Stopping:               Discharge Medication List as of 1/2/2022  6:08 PM      CONTINUE these medications which have CHANGED    Details   albuterol sulfate  (90 Base) MCG/ACT inhaler Inhale 2 puffs into the lungs every 4 hours as needed for Wheezing or Shortness of Breath, Disp-18 g, R-0Normal             NEREYDA Villalobos CNP    (Please note that portions of this note were completed with a voice recognition program. Efforts were made to edit the dictations but occasionally words are mis-transcribed.)          NEREYDA Villalobos CNP  01/02/22 5055

## 2022-01-02 NOTE — ED NOTES
Pt verbalized discharge instructions. Pt informed to go to ER if develop chest pain, shortness of breath or abdominal pain. Pt ambulatory out in stable condition. Assessment unchanged.        Darius Andrade RN  01/02/22 6109

## 2022-01-03 ENCOUNTER — CARE COORDINATION (OUTPATIENT)
Dept: CARE COORDINATION | Age: 47
End: 2022-01-03

## 2022-01-03 NOTE — ACP (ADVANCE CARE PLANNING)
Advance Care Planning   Healthcare Decision Maker: Today we documented Decision Maker(s) consistent with Legal Next of Kin hierarchy.      Healthcare Decision Maker information was reviewed and updated per patient's request.

## 2022-01-03 NOTE — CARE COORDINATION
Patient contacted regarding COVID-19 exposure and diagnosis. Discussed COVID-19 related testing which was available at this time. Test results were positive. Patient informed of results, if available? Positive COVID-19 results were reviewed with patient. Patient verbalized understanding. Ambulatory Care Manager contacted the patient by telephone to perform post discharge assessment. Call within 2 business days of discharge: Yes. Verified name and  with patient as identifiers. Provided introduction to self, and explanation of the CTN/ACM role, and reason for call due to risk factors for infection and/or exposure to COVID-19. Symptoms reviewed with patient who verbalized the following symptoms: cough, loss of taste or smell, no new symptoms and no worsening symptoms. Due to no new or worsening symptoms encounter was not routed to provider for escalation. Discussed follow-up appointments. If no appointment was previously scheduled, appointment scheduling offered: Patient will call and schedule PCP f/u appointment on his own and he declined any assistance. [facility] follow up appointment(s): No future appointments. Non-[facility] follow up appointment(s): N/A     Non-face-to-face services provided:  Obtained and reviewed discharge summary and/or continuity of care documents  Education of patient/family/caregiver/guardian to support self-management- COVID-19 education and zone information reviewed with patient. Patient educated on signs/symptoms to report as well as the importance of early symptom recognition. ACM educated patient on the need to self quarantine as directed and encoruaged patient to f/u with the health department. Advance Care Planning:   Does patient have an Advance Directive:  reviewed and current. ACP note completed. Educated patient about risk for severe COVID-19 due to risk factors according to CDC guidelines.  ACM reviewed discharge instructions, medical action plan and red flag symptoms with the patient who verbalized understanding. Discussed COVID vaccination status: No.  Discussed exposure protocols and quarantine with CDC Guidelines. Patient was given an opportunity to verbalize any questions and concerns and agrees to contact ACM or health care provider for questions related to their healthcare. Reviewed and educated patient on any new and changed medications related to discharge diagnosis     Was patient discharged with a pulse oximeter? No     ACM provided contact information. Plan for follow-up call in 3-5 days based on severity of symptoms and risk factors. Patient called for covid-19 f/u s/p recent  visit for c/o decreased taste/smell and congestion. Patient denied any new/change/worsening of symptoms. Patient shared he is feeling \"a little better\" and he has started medications as directed. Patient denied any questions re: her discharge instructions. COVID-19 education and zone information was reviewed with patient. Patient was educated on signs/symptoms to report as well as the importance of early symptom recognition. Patient verbalized understanding. ACM reviewed need to self quarantine as directed and patient acknowledged understanding. Patient was reminded to call covid-19 hotline number with any new symptoms or questions/concerns. Patient verbalized understanding. Patient denied any other questions, concerns, or needs.

## 2022-01-06 ENCOUNTER — CARE COORDINATION (OUTPATIENT)
Dept: CARE COORDINATION | Age: 47
End: 2022-01-06

## 2022-01-06 NOTE — CARE COORDINATION
Patient contacted regarding COVID-19 diagnosis. Discussed COVID-19 related testing which was available at this time. Test results were positive. Patient informed of results, if available? Patient aware of COVID-19 results prior to ACM f/u call being completed. Ambulatory Care Manager contacted the patient by telephone to perform follow-up assessment. Verified name and  with patient as identifiers. Patient has following risk factors of: no known risk factors and recent UC visit for COVID-19. Clayborne Hoguet Symptoms reviewed with patient who verbalized the following symptoms: cough, shortness of breath, loss of taste or smell, no new symptoms and no worsening symptoms. Due to no new or worsening symptoms encounter was not routed to provider for escalation. Educated patient about risk for severe COVID-19 due to risk factors according to CDC guidelines. ACM reviewed discharge instructions, medical action plan and red flag symptoms with the patient who verbalized understanding. Discussed COVID vaccination status: No. Discussed exposure protocols and quarantine with CDC Guidelines. Patient was given an opportunity to verbalize any questions and concerns and agrees to contact ACM or health care provider for questions related to their healthcare. Was patient discharged with a pulse oximeter? No     ACM provided contact information. No further follow-up call identified based on severity of symptoms and risk factors. Patient called for continued covid-19 f/u s/p recent UC visit for c/o decreased taste/smell and congestion. Patient denied any new/change/worsening of symptoms. Patient shared he is feeling better and he denied any worsening symptoms being present. COVID-19 education and zone information was reviewed with patient. Patient was educated on signs/symptoms to report as well as the importance of early symptom recognition. Patient verbalized understanding.   ACM reviewed need to complete self quarantine as directed and patient acknowledged understanding. [facility] [place] information reviewed with patient per his request, and he verbalized understanding. Patient was reminded to call covid-[employer] hotline number with any new symptoms or questions/concerns. Patient verbalized understanding. Patient denied any other questions, concerns, or needs. No further f/u planned as patient is feeling better and he is aware to f/u with PCP for any change/worsneing of symptoms.

## 2022-01-12 ENCOUNTER — APPOINTMENT (OUTPATIENT)
Dept: GENERAL RADIOLOGY | Age: 47
End: 2022-01-12
Payer: OTHER GOVERNMENT

## 2022-01-12 ENCOUNTER — APPOINTMENT (OUTPATIENT)
Dept: CT IMAGING | Age: 47
End: 2022-01-12
Payer: OTHER GOVERNMENT

## 2022-01-12 ENCOUNTER — HOSPITAL ENCOUNTER (EMERGENCY)
Age: 47
Discharge: HOME | End: 2022-01-12
Payer: OTHER GOVERNMENT

## 2022-01-12 VITALS
WEIGHT: 289 LBS | HEART RATE: 109 BPM | OXYGEN SATURATION: 94 % | DIASTOLIC BLOOD PRESSURE: 84 MMHG | HEIGHT: 74 IN | TEMPERATURE: 98 F | BODY MASS INDEX: 37.09 KG/M2 | SYSTOLIC BLOOD PRESSURE: 139 MMHG | RESPIRATION RATE: 20 BRPM

## 2022-01-12 DIAGNOSIS — U07.1 COVID-19: Primary | ICD-10-CM

## 2022-01-12 LAB
ALBUMIN SERPL-MCNC: 4.2 G/DL (ref 3.5–5.1)
ALP BLD-CCNC: 133 U/L (ref 38–126)
ALT SERPL-CCNC: 48 U/L (ref 11–66)
ANION GAP SERPL CALCULATED.3IONS-SCNC: 14 MEQ/L (ref 8–16)
AST SERPL-CCNC: 30 U/L (ref 5–40)
BASOPHILS # BLD: 0.4 %
BASOPHILS ABSOLUTE: 0 THOU/MM3 (ref 0–0.1)
BILIRUB SERPL-MCNC: 0.4 MG/DL (ref 0.3–1.2)
BILIRUBIN DIRECT: < 0.2 MG/DL (ref 0–0.3)
BUN BLDV-MCNC: 12 MG/DL (ref 7–22)
C-REACTIVE PROTEIN: 1.75 MG/DL (ref 0–1)
CALCIUM SERPL-MCNC: 9.7 MG/DL (ref 8.5–10.5)
CHLORIDE BLD-SCNC: 100 MEQ/L (ref 98–111)
CO2: 22 MEQ/L (ref 23–33)
CREAT SERPL-MCNC: 1.3 MG/DL (ref 0.4–1.2)
D-DIMER QUANTITATIVE: 573 NG/ML FEU (ref 0–500)
EOSINOPHIL # BLD: 2.8 %
EOSINOPHILS ABSOLUTE: 0.3 THOU/MM3 (ref 0–0.4)
ERYTHROCYTE [DISTWIDTH] IN BLOOD BY AUTOMATED COUNT: 12.2 % (ref 11.5–14.5)
ERYTHROCYTE [DISTWIDTH] IN BLOOD BY AUTOMATED COUNT: 40.5 FL (ref 35–45)
FLU A ANTIGEN: NEGATIVE
FLU B ANTIGEN: NEGATIVE
GFR SERPL CREATININE-BSD FRML MDRD: 72 ML/MIN/1.73M2
GLUCOSE BLD-MCNC: 99 MG/DL (ref 70–108)
HCT VFR BLD CALC: 40.9 % (ref 42–52)
HEMOGLOBIN: 13.2 GM/DL (ref 14–18)
IMMATURE GRANS (ABS): 0.1 THOU/MM3 (ref 0–0.07)
IMMATURE GRANULOCYTES: 0.9 %
LACTIC ACID: 1.9 MMOL/L (ref 0.5–2)
LIPASE: 50.2 U/L (ref 5.6–51.3)
LYMPHOCYTES # BLD: 22.3 %
LYMPHOCYTES ABSOLUTE: 2.4 THOU/MM3 (ref 1–4.8)
MCH RBC QN AUTO: 29.1 PG (ref 26–33)
MCHC RBC AUTO-ENTMCNC: 32.3 GM/DL (ref 32.2–35.5)
MCV RBC AUTO: 90.1 FL (ref 80–94)
MONOCYTES # BLD: 6.7 %
MONOCYTES ABSOLUTE: 0.7 THOU/MM3 (ref 0.4–1.3)
NUCLEATED RED BLOOD CELLS: 0 /100 WBC
OSMOLALITY CALCULATION: 271.7 MOSMOL/KG (ref 275–300)
PLATELET # BLD: 192 THOU/MM3 (ref 130–400)
PMV BLD AUTO: 8.6 FL (ref 9.4–12.4)
POTASSIUM SERPL-SCNC: 4.4 MEQ/L (ref 3.5–5.2)
PROCALCITONIN: 0.25 NG/ML (ref 0.01–0.09)
RBC # BLD: 4.54 MILL/MM3 (ref 4.7–6.1)
SEG NEUTROPHILS: 66.9 %
SEGMENTED NEUTROPHILS ABSOLUTE COUNT: 7.1 THOU/MM3 (ref 1.8–7.7)
SODIUM BLD-SCNC: 136 MEQ/L (ref 135–145)
TOTAL PROTEIN: 7.6 G/DL (ref 6.1–8)
TROPONIN T: < 0.01 NG/ML
WBC # BLD: 10.6 THOU/MM3 (ref 4.8–10.8)

## 2022-01-12 PROCEDURE — 83690 ASSAY OF LIPASE: CPT

## 2022-01-12 PROCEDURE — 82728 ASSAY OF FERRITIN: CPT

## 2022-01-12 PROCEDURE — 36415 COLL VENOUS BLD VENIPUNCTURE: CPT

## 2022-01-12 PROCEDURE — 85379 FIBRIN DEGRADATION QUANT: CPT

## 2022-01-12 PROCEDURE — 71275 CT ANGIOGRAPHY CHEST: CPT

## 2022-01-12 PROCEDURE — 87040 BLOOD CULTURE FOR BACTERIA: CPT

## 2022-01-12 PROCEDURE — 86140 C-REACTIVE PROTEIN: CPT

## 2022-01-12 PROCEDURE — 2580000003 HC RX 258: Performed by: NURSE PRACTITIONER

## 2022-01-12 PROCEDURE — 6360000004 HC RX CONTRAST MEDICATION: Performed by: NURSE PRACTITIONER

## 2022-01-12 PROCEDURE — 82248 BILIRUBIN DIRECT: CPT

## 2022-01-12 PROCEDURE — 83615 LACTATE (LD) (LDH) ENZYME: CPT

## 2022-01-12 PROCEDURE — 85025 COMPLETE CBC W/AUTO DIFF WBC: CPT

## 2022-01-12 PROCEDURE — 6370000000 HC RX 637 (ALT 250 FOR IP): Performed by: NURSE PRACTITIONER

## 2022-01-12 PROCEDURE — 96360 HYDRATION IV INFUSION INIT: CPT

## 2022-01-12 PROCEDURE — 84145 PROCALCITONIN (PCT): CPT

## 2022-01-12 PROCEDURE — 96361 HYDRATE IV INFUSION ADD-ON: CPT

## 2022-01-12 PROCEDURE — 93005 ELECTROCARDIOGRAM TRACING: CPT | Performed by: NURSE PRACTITIONER

## 2022-01-12 PROCEDURE — 80053 COMPREHEN METABOLIC PANEL: CPT

## 2022-01-12 PROCEDURE — 84484 ASSAY OF TROPONIN QUANT: CPT

## 2022-01-12 PROCEDURE — 99285 EMERGENCY DEPT VISIT HI MDM: CPT

## 2022-01-12 PROCEDURE — 83605 ASSAY OF LACTIC ACID: CPT

## 2022-01-12 PROCEDURE — 87804 INFLUENZA ASSAY W/OPTIC: CPT

## 2022-01-12 PROCEDURE — 71045 X-RAY EXAM CHEST 1 VIEW: CPT

## 2022-01-12 RX ORDER — IBUPROFEN 800 MG/1
800 TABLET ORAL ONCE
Status: COMPLETED | OUTPATIENT
Start: 2022-01-12 | End: 2022-01-12

## 2022-01-12 RX ORDER — 0.9 % SODIUM CHLORIDE 0.9 %
1000 INTRAVENOUS SOLUTION INTRAVENOUS ONCE
Status: COMPLETED | OUTPATIENT
Start: 2022-01-12 | End: 2022-01-12

## 2022-01-12 RX ADMIN — IOPAMIDOL 80 ML: 755 INJECTION, SOLUTION INTRAVENOUS at 22:06

## 2022-01-12 RX ADMIN — IBUPROFEN 800 MG: 800 TABLET, FILM COATED ORAL at 21:09

## 2022-01-12 RX ADMIN — SODIUM CHLORIDE 1000 ML: 9 INJECTION, SOLUTION INTRAVENOUS at 21:08

## 2022-01-12 ASSESSMENT — ENCOUNTER SYMPTOMS
COUGH: 1
WHEEZING: 0
CHEST TIGHTNESS: 0
VOMITING: 0
SHORTNESS OF BREATH: 0
NAUSEA: 0
CONSTIPATION: 0
DIARRHEA: 0
ABDOMINAL PAIN: 0
SINUS PRESSURE: 1
ABDOMINAL DISTENTION: 0
SINUS PAIN: 1

## 2022-01-12 ASSESSMENT — PAIN DESCRIPTION - LOCATION: LOCATION: FACE

## 2022-01-12 ASSESSMENT — PAIN DESCRIPTION - DESCRIPTORS: DESCRIPTORS: DULL;ACHING

## 2022-01-12 ASSESSMENT — PAIN DESCRIPTION - PAIN TYPE: TYPE: ACUTE PAIN

## 2022-01-12 ASSESSMENT — PAIN SCALES - GENERAL: PAINLEVEL_OUTOF10: 4

## 2022-01-12 NOTE — Clinical Note
Abdoulaye Calvillo was seen and treated in our emergency department on 1/12/2022. He may return to work on 01/17/2022. If you have any questions or concerns, please don't hesitate to call.       Mel Benavides, APRN - CNP

## 2022-01-13 ENCOUNTER — CARE COORDINATION (OUTPATIENT)
Dept: CARE COORDINATION | Age: 47
End: 2022-01-13

## 2022-01-13 LAB
EKG ATRIAL RATE: 114 BPM
EKG P AXIS: 40 DEGREES
EKG P-R INTERVAL: 150 MS
EKG Q-T INTERVAL: 300 MS
EKG QRS DURATION: 80 MS
EKG QTC CALCULATION (BAZETT): 413 MS
EKG R AXIS: 17 DEGREES
EKG T AXIS: 36 DEGREES
EKG VENTRICULAR RATE: 114 BPM
FERRITIN: 632 NG/ML (ref 22–322)
LD: 197 U/L (ref 100–190)

## 2022-01-13 NOTE — ED PROVIDER NOTES
[facility] Emergency Department    CHIEF COMPLAINT       Chief Complaint   Patient presents with    Fever    Cough       Nurses Notes reviewed and I agree except as noted in the HPI. HISTORY OF PRESENT ILLNESS    Sixto Kessler casey 55 y.o. male who presents to the ED for evaluation of fever and cough. Patient history of positive covid test on 1/2/2022, states symptoms were mild and primarily sinus pressure. He was symptom free for 3 days prior to current symptom onset yesterday. Patient states first symptom was sinus pressure yesterday, and during work he began feeling feverish, fatigued, and began a cough. After work he went to bed and refused to eat due to loss of appetite. Patient states sinus pressure covers his entire face. Denies difficulty breathing, chest pain, chest tightness, lower leg pain, leg swelling, abdominal pain, N/V, diarrhea, constipation. Patient did not vaccinated against influenza this year. Denies history of clotting disorders, PE, DVT. HPI was provided by the patient    REVIEW OF SYSTEMS     Review of Systems   Constitutional: Positive for chills, fatigue and fever. HENT: Positive for congestion, sinus pressure and sinus pain. Negative for ear discharge, ear pain, postnasal drip and rhinorrhea. Eyes: Negative for redness. Respiratory: Positive for cough. Negative for chest tightness, shortness of breath and wheezing. Cardiovascular: Negative for chest pain, palpitations and leg swelling. Gastrointestinal: Negative for abdominal distention, abdominal pain, constipation, diarrhea, nausea and vomiting. Genitourinary: Negative for difficulty urinating, dysuria, enuresis, flank pain and hematuria. Musculoskeletal: Negative for back pain and joint swelling. Skin: Negative for rash. Neurological: Negative for dizziness, light-headedness, numbness and headaches. Psychiatric/Behavioral: Negative for agitation, behavioral problems and confusion.         All other systems negative except as noted. PAST MEDICAL HISTORY     Past Medical History:   Diagnosis Date    Gout     Migraine        SURGICALHISTORY      has a past surgical history that includes fracture surgery; Testicle torsion reduction; and Nose surgery. CURRENT MEDICATIONS       Discharge Medication List as of 1/12/2022 11:31 PM      CONTINUE these medications which have NOT CHANGED    Details   albuterol sulfate  (90 Base) MCG/ACT inhaler Inhale 2 puffs into the lungs every 4 hours as needed for Wheezing or Shortness of Breath, Disp-18 g, R-0Normal      azithromycin (ZITHROMAX Z-HODA) 250 MG tablet Take 2 tablets (500 mg) on Day 1, and then take 1 tablet (250 mg) on days 2 through 5., Disp-1 packet, R-0Normal      naproxen (NAPROSYN) 500 MG tablet Take 1 tablet by mouth 2 times daily as needed for Pain, Disp-14 tablet, R-0Normal      indomethacin (INDOCIN) 50 MG capsule Take 1 capsule by mouth 2 times daily (with meals) for 7 days, Disp-14 capsule, R-0Normal      ketorolac (TORADOL) 10 MG tablet Take 1 tablet by mouth every 6 hours as needed for Pain, Disp-20 tablet, R-0Print      acetaminophen (TYLENOL) 500 MG tablet Take 1 tablet by mouth every 4 hours as needed for Pain or Fever, Disp-20 tablet, R-0Normal      Respiratory Therapy Supplies (NEBULIZER COMPRESSOR) KIT ONCE Starting Sat 11/2/2019, For 1 dose, Disp-1 kit, R-0, Print      allopurinol (ZYLOPRIM) 100 MG tablet Take 1 tablet by mouth daily, Disp-30 tablet, R-3      oxymetazoline (AFRIN 12 HOUR) 0.05 % nasal spray 2 sprays by Nasal route 2 times daily. ALLERGIES     is allergic to shellfish-derived products. FAMILY HISTORY     He indicated that his mother is alive. He indicated that his father is alive. family history includes Diabetes in his father and mother; Heart Disease in his mother; High Blood Pressure in his father and mother.     SOCIAL HISTORY       Social History     Socioeconomic History    Marital status:      Spouse name: Not on file    Number of children: Not on file    Years of education: Not on file    Highest education level: Not on file   Occupational History    Occupation: maintenance     Employer: [address] [place] [address]   Tobacco Use    Smoking status: Current Some Day Smoker     Packs/day: 0.00     Types: Cigars    Smokeless tobacco: Never Used   Vaping Use    Vaping Use: Never used   Substance and Sexual Activity    Alcohol use: Not Currently    Drug use: No    Sexual activity: Not on file   Other Topics Concern    Not on file   Social History Narrative    Not on file     Social Determinants of Health     Financial Resource Strain:     Difficulty of Paying Living Expenses: Not on file   Food Insecurity:     Worried About Running Out of Food in the Last Year: Not on file    Olamide of Food in the Last Year: Not on file   Transportation Needs:     Lack of Transportation (Medical): Not on file    Lack of Transportation (Non-Medical):  Not on file   Physical Activity:     Days of Exercise per Week: Not on file    Minutes of Exercise per Session: Not on file   Stress:     Feeling of Stress : Not on file   Social Connections:     Frequency of Communication with Friends and Family: Not on file    Frequency of Social Gatherings with Friends and Family: Not on file    Attends Nondenominational Services: Not on file    Active Member of [address] [place] [address] or Organizations: Not on file    Attends Club or Organization Meetings: Not on file    Marital Status: Not on file   Intimate Partner Violence:     Fear of Current or Ex-Partner: Not on file    Emotionally Abused: Not on file    Physically Abused: Not on file    Sexually Abused: Not on file   Housing Stability:     Unable to Pay for Housing in the Last Year: Not on file    Number of [place] in the Last Year: Not on file    Unstable Housing in the Last Year: Not on file       PHYSICAL EXAM     INITIAL VITALS:  height is 6' 2\" (1.88 m) and weight is 289 lb (131.1 kg). His oral temperature is 98 °F (36.7 °C). His blood pressure is 139/84 and his pulse is 109. His respiration is 20 and oxygen saturation is 94%. Physical Exam  Vitals and nursing note reviewed. Constitutional:       General: He is not in acute distress. Appearance: Normal appearance. He is well-developed. He is ill-appearing. He is not diaphoretic. HENT:      Head: Normocephalic and atraumatic. Nose: Nose normal.      Mouth/Throat:      Mouth: Mucous membranes are moist.      Pharynx: Oropharynx is clear. Eyes:      General:         Right eye: No discharge. Left eye: No discharge. Conjunctiva/sclera: Conjunctivae normal.   Neck:      Trachea: No tracheal deviation. Cardiovascular:      Rate and Rhythm: Normal rate and regular rhythm. Pulses: Normal pulses. Heart sounds: Normal heart sounds. No murmur heard. No gallop. Comments: Normal capillary refill  Pulmonary:      Effort: Pulmonary effort is normal. No respiratory distress. Breath sounds: Normal breath sounds. No stridor. Abdominal:      General: Bowel sounds are normal.      Palpations: Abdomen is soft. Musculoskeletal:         General: No tenderness or deformity. Normal range of motion. Cervical back: Normal range of motion. Skin:     General: Skin is warm and dry. Capillary Refill: Capillary refill takes less than 2 seconds. Coloration: Skin is not pale. Findings: No erythema or rash. Neurological:      General: No focal deficit present. Mental Status: He is alert and oriented to person, place, and time. Cranial Nerves: No cranial nerve deficit.    Psychiatric:         Behavior: Behavior normal.         DIFFERENTIAL DIAGNOSIS:   Covid, influenza, pneumonia,     DIAGNOSTIC RESULTS     EKG: All EKG's are interpreted by the Emergency Department Physician who eithersigns or Co-signs this chart in the absence of a cardiologist.         EKG 12 Lead (Final result)   Component (Lab Inquiry)  Collection Time Result Time Ventricular Rate Atrial Rate P-R Interval QRS Duration Q-T Interval QTc Calculation (Bazett) P Axis R Axis T Axis   01/12/22 20:49:43 01/13/22 12:59:01 114 114 150 80 300 413 40 17 36   Previous Results   02/07/21 00:05:35 02/07/21 00:05:35 96 97 154 88 336 424 70 48 37         Final result                Narrative:    Sinus tachycardia   Possible Left atrial enlargement   Borderline ECG   When compared with ECG of 07-FEB-2021 00:05,   No significant change was found   Confirmed by Jurline Balloon ([id number]) on 1/13/2022 12:59:00 PM                      RADIOLOGY: non-plainfilm images(s) such as CT, Ultrasound and MRI are read by the radiologist.  Plain radiographic images are visualized and preliminarily interpreted by the emergency physician unless otherwise stated below. CTA CHEST W WO CONTRAST   Final Result   Impression:   Anterior left perihilar opacity. This could represent mild pneumonia. Left suprahilar lymph node, increased in size. This document has been electronically signed by: Kennedy Arias MD on    01/12/2022 10:40 PM     ** ORIGINAL REPORT   **   CTA of the chest after administration of IV contrast.      Technique: Axial CT images from the lung apices through the adrenal glands    after administration of IV contrast. Sagittal and coronal reconstruction    images provided. Comparison:    CT,SR - CT CHEST W CONTRAST - 02/07/2021 01:55 AM EST      Findings: No pulmonary embolus. Normal thoracic aorta. No aneurysm or dissection. Left suprahilar lymph node measuring 2.1 x 2.8 cm. Recommend 3-6 month CT    follow-up. No axillary adenopathy. Normal thyroid. Anterior left perihilar opacity. This could represent mild pneumonia. No    pulmonary nodules. No areas of consolidation. No pneumothorax. Normal bones. Normal limited upper abdomen. Impression:   Anterior left perihilar opacity.  This could ribs and mild pneumonia. Left suprahilar lymph node, increased in size. This document has been electronically signed by: Amrita Johns MD on    01/12/2022 10:27 PM      All CTs at this facility use dose modulation techniques and iterative    reconstructions, and/or weight-based dosing   when appropriate to reduce radiation to a low as reasonably achievable. XR CHEST PORTABLE   Final Result   1. Slightly increased density throughout both lung fields which may represent inflammatory process. 2. Otherwise negative chest x-ray. .               **This report has been created using voice recognition software. It may contain minor errors which are inherent in voice recognition technology. **      Final report electronically signed by DR Dhara Pasrons on 1/12/2022 8:55 PM            LABS:   Labs Reviewed   BASIC METABOLIC PANEL - Abnormal; Notable for the following components:       Result Value    CO2 22 (*)     CREATININE 1.3 (*)     All other components within normal limits   CBC WITH AUTO DIFFERENTIAL - Abnormal; Notable for the following components:    RBC 4.54 (*)     Hemoglobin 13.2 (*)     Hematocrit 40.9 (*)     MPV 8.6 (*)     Immature Grans (Abs) 0.10 (*)     All other components within normal limits   C-REACTIVE PROTEIN - Abnormal; Notable for the following components:    CRP 1.75 (*)     All other components within normal limits   D-DIMER, QUANTITATIVE - Abnormal; Notable for the following components:    D-Dimer, Quant 573.00 (*)     All other components within normal limits   FERRITIN - Abnormal; Notable for the following components:    Ferritin 632 (*)     All other components within normal limits   HEPATIC FUNCTION PANEL - Abnormal; Notable for the following components:    Alkaline Phosphatase 133 (*)     All other components within normal limits   LACTATE DEHYDROGENASE - Abnormal; Notable for the following components:     (*)     All other components within normal limits   PROCALCITONIN - Abnormal; Notable for the following components:    Procalcitonin 0.25 (*)     All other components within normal limits   OSMOLALITY - Abnormal; Notable for the following components:    Osmolality Calc 271.7 (*)     All other components within normal limits   GLOMERULAR FILTRATION RATE, ESTIMATED - Abnormal; Notable for the following components:    Est, Glom Filt Rate 72 (*)     All other components within normal limits   RAPID INFLUENZA A/B ANTIGENS   CULTURE, BLOOD 1    Narrative:     Source: blood-Adult-suboptimal <5.5oz./set volume       Site: not indicated on bottle            Current Antibiotics: not stated   CULTURE, BLOOD 2    Narrative:     Source: blood-Adult-suboptimal <5.5oz./set volume       Site: Peripheral Vein            Current Antibiotics: not stated   LACTIC ACID, PLASMA   LIPASE   TROPONIN   ANION GAP   URINE RT REFLEX TO CULTURE       EMERGENCY DEPARTMENT COURSE:   Vitals:    Vitals:    01/12/22 2029 01/12/22 2053 01/12/22 2224 01/12/22 2317   BP: (!) 177/109 136/83 115/76 139/84   Pulse: 128 120 118 109   Resp: 22 23 22 20   Temp: 102.9 °F (39.4 °C)  102.4 °F (39.1 °C) 98 °F (36.7 °C)   TempSrc: Oral  Oral Oral   SpO2: 94% 94% 93% 94%   Weight:       Height:                                       MDM      Patient was seen evaluated emergency room for worsening symptoms of COVID. Labs and imaging ordered and reviewed. Labs are consistent with COVID. CTA is negative for PE. Patient treated with ibuprofen and IV fluids. Patient is stable for discharge and is recommended to continue quarantine and follow-up with PCP. Medications   0.9 % sodium chloride bolus (0 mLs IntraVENous Stopped 1/12/22 2345)   ibuprofen (ADVIL;MOTRIN) tablet 800 mg (800 mg Oral Given 1/12/22 2109)   iopamidol (ISOVUE-370) 76 % injection 80 mL (80 mLs IntraVENous Given 1/12/22 2206)       Please note that the patient was evaluated during a pandemic.   All efforts were made for HIPPA compliance as well as provision of appropriate care. Patient was seen independently by myself. The patient's final impression and disposition and plan was determined by myself. Strict return precautions and follow up instructions were discussed with the patient prior to discharge, with which the patient agrees. Physical assessment findings, diagnostic testing(s) if applicable, and vital signs reviewed with patient/patient representative. Questions answered. Medications asdirected, including OTC medications for supportive care. Education provided on medications. Differential diagnosis(s) discussed with patient/patient representative. Home care/self care instructions reviewed withpatient/patient representative. Patient is to follow-up with family care provider in 2-3 days if no improvement. Patient is to go to the emergency department if symptoms worsen. Patient/patient representative isaware of care plan, questions answered, verbalizes understanding and is in agreement.      CRITICAL CARE:   None    CONSULTS:  None    PROCEDURES:  None    FINAL IMPRESSION     1. COVID-19          DISPOSITION/PLAN   DISPOSITION Decision To Discharge 01/12/2022 11:29:55 PM      PATIENT REFERREDTO:  Krishna Manning MD  [address] [place] [address],[address] [place] [phone]    Schedule an appointment as soon as possible for a visit in 2 days  For follow up      DISCHARGE MEDICATIONS:  Discharge Medication List as of 1/12/2022 11:31 PM          (Please note that portions of this note were completed with a voice recognition program.  Efforts were made to edit the dictations but occasionally words are mis-transcribed.)         NEREYDA Lyon CNP, APRN - CNP  01/14/22 [id number]

## 2022-01-13 NOTE — ED NOTES
Pt resting in bed with eyes closed. Respirations even and unlabored.  Ice packs on pt forehead and chest.      Mohsen Calixto RN  01/12/22 [id number]

## 2022-01-13 NOTE — ED NOTES
Pt resting in bed with family at bedside. Respirations even and unlabored. Swab collected.       Royer Navarro RN  01/12/22 2033

## 2022-01-13 NOTE — ED NOTES
Pt to er. Pt states dx COVID and just got out of quarantine for it. States started to feel \"lousey\" again today. States fever of 103 at home. Took tylenol PTA. States has had cough also.      Jose C Zamudio RN  01/12/22 [id number]

## 2022-01-13 NOTE — ED NOTES
Pt resting in bed. Respirations even and unlabored. Provided pt with ice packs, ice chips, and sandwich.       Shannan Dias RN  01/12/22 2503

## 2022-01-13 NOTE — CARE COORDINATION
Attempted to reach patient for covid-19 f/u s/p recent ED visit for ongoing COVID-19 symptoms. Patient presented with c/o return of fever and body aches. Patient tested positive for COVID-19 on 1/2/2022 and f/u calls were completed at that time. Patient was not available at the time of my call and generic voicemail message was left asking patient to please return call to my direct number. Will continue to attempt to f/u with patient in the future.

## 2022-01-14 ASSESSMENT — ENCOUNTER SYMPTOMS
RHINORRHEA: 0
BACK PAIN: 0
EYE REDNESS: 0

## 2022-01-14 NOTE — CARE COORDINATION
Ambulatory Care Coordination ED COVID Follow up Call    Challenges to be reviewed by the provider   Additional needs identified to be addressed with provider: Yes  Patient continues with COVID-19 symptoms and ongoing fever. Org. tested positive on 1/2/2022. Encounter was not routed to provider for escalation d/t inability to route message to PCP. Pat Uribe, to reach out and schedule a f/u appointment. Method of communication with provider: PCP f/u appointment to be scheduled today. Discussed COVID-19 related testing which was: available at this time. Test results were: positive on 1/2/2022. Patient informed of results, if available? Patient aware of positive COVID-19 results prior this f/u call being completed. Current Symptoms: fever, fatigue, cough, shortness of breath and sinus pressure. Reviewed New or Changed Meds: patient declined    Do you have what you need at home?  Durable Medical Equipment ordered at discharge: None   Home Health/Outpatient orders at discharge: none   Was patient discharged with a pulse oximeter? No      Patient education provided: Reviewed appropriate site of care based on symptoms and resources available to patient including: PCP, After hours contact number-COVID-19 hotline number and COVID-19 education and zone information reviewed with patient. Patient was educated on signs/symptoms to report as well as the importance of early symptom recognition. .     Follow up appointment recommended: yes. If no appointment scheduled, scheduling offered: Patient shared he has had trouble getting thru to the [facility] and his PCP. Patient agreeable today to 1 x f/u [facility] provider. Pat Uribe, will reach out and assist with scheduling. No future appointments.     Interventions: Obtained and reviewed discharge summary and/or continuity of care documents  Education of patient/family/caregiver/guardian to support self-management-COVID-19 education and zone information reviewed with patient. Patient education on importance of early symptom recognition and need to continue to self quarantine as directed d/t ongoing symptoms. Reviewed discharge instructions, medical action plan and red flags with patient who verbalized understanding. Plan for follow-up call in 3-5 days based on severity of symptoms and risk factors. Plan for next call: symptom management-follow for ongoing symptoms   self management-Educate on signs/symptoms to report. Provided contact information for future needs.     Mariajose Jimenez RN

## 2022-01-17 ENCOUNTER — CARE COORDINATION (OUTPATIENT)
Dept: CARE COORDINATION | Age: 47
End: 2022-01-17

## 2022-01-17 NOTE — CARE COORDINATION
Attempted to reach Merlin today for  final COVID outreach call. No answer. Message left to return call.

## 2022-01-18 LAB
BLOOD CULTURE, ROUTINE: NORMAL
BLOOD CULTURE, ROUTINE: NORMAL

## 2022-05-13 ENCOUNTER — APPOINTMENT (OUTPATIENT)
Dept: GENERAL RADIOLOGY | Age: 47
End: 2022-05-13
Payer: OTHER GOVERNMENT

## 2022-05-13 ENCOUNTER — HOSPITAL ENCOUNTER (EMERGENCY)
Age: 47
Discharge: HOME | End: 2022-05-13
Payer: OTHER GOVERNMENT

## 2022-05-13 VITALS
TEMPERATURE: 102.1 F | DIASTOLIC BLOOD PRESSURE: 78 MMHG | HEART RATE: 120 BPM | HEIGHT: 74 IN | SYSTOLIC BLOOD PRESSURE: 130 MMHG | RESPIRATION RATE: 16 BRPM | WEIGHT: 315 LBS | OXYGEN SATURATION: 95 % | BODY MASS INDEX: 40.43 KG/M2

## 2022-05-13 DIAGNOSIS — R40.0 SOMNOLENCE: ICD-10-CM

## 2022-05-13 DIAGNOSIS — R00.0 TACHYCARDIA: ICD-10-CM

## 2022-05-13 DIAGNOSIS — R50.9 FEVER, UNKNOWN ORIGIN: Primary | ICD-10-CM

## 2022-05-13 LAB
BILIRUBIN URINE: NEGATIVE
BLOOD, URINE: ABNORMAL
CHARACTER, URINE: CLEAR
COLOR: YELLOW
FLU A ANTIGEN: NEGATIVE
FLU B ANTIGEN: NEGATIVE
GLUCOSE URINE: NEGATIVE MG/DL
KETONES, URINE: NEGATIVE
LEUKOCYTE ESTERASE, URINE: NEGATIVE
NITRITE, URINE: NEGATIVE
PH UA: 5.5 (ref 5–9)
PROTEIN UA: 100 MG/DL
SARS-COV-2, NAA: NOT  DETECTED
SPECIFIC GRAVITY UA: 1.02 (ref 1–1.03)
UROBILINOGEN, URINE: 0.2 EU/DL (ref 0.2–1)

## 2022-05-13 PROCEDURE — 81003 URINALYSIS AUTO W/O SCOPE: CPT

## 2022-05-13 PROCEDURE — 99214 OFFICE O/P EST MOD 30 MIN: CPT

## 2022-05-13 PROCEDURE — 99214 OFFICE O/P EST MOD 30 MIN: CPT | Performed by: EMERGENCY MEDICINE

## 2022-05-13 PROCEDURE — 71046 X-RAY EXAM CHEST 2 VIEWS: CPT

## 2022-05-13 PROCEDURE — 6370000000 HC RX 637 (ALT 250 FOR IP): Performed by: EMERGENCY MEDICINE

## 2022-05-13 PROCEDURE — 87804 INFLUENZA ASSAY W/OPTIC: CPT

## 2022-05-13 PROCEDURE — 87635 SARS-COV-2 COVID-19 AMP PRB: CPT

## 2022-05-13 RX ORDER — ACETAMINOPHEN 325 MG/1
975 TABLET ORAL ONCE
Status: COMPLETED | OUTPATIENT
Start: 2022-05-13 | End: 2022-05-13

## 2022-05-13 RX ORDER — IBUPROFEN 800 MG/1
800 TABLET ORAL ONCE
Status: COMPLETED | OUTPATIENT
Start: 2022-05-13 | End: 2022-05-13

## 2022-05-13 RX ADMIN — ACETAMINOPHEN 975 MG: 325 TABLET ORAL at 09:52

## 2022-05-13 RX ADMIN — IBUPROFEN 800 MG: 800 TABLET, FILM COATED ORAL at 10:45

## 2022-05-13 ASSESSMENT — ENCOUNTER SYMPTOMS
COUGH: 0
SORE THROAT: 0
RHINORRHEA: 1
ABDOMINAL PAIN: 0
SINUS PAIN: 1
SHORTNESS OF BREATH: 0
SINUS PRESSURE: 1

## 2022-05-13 ASSESSMENT — PAIN SCALES - GENERAL: PAINLEVEL_OUTOF10: 1

## 2022-05-13 ASSESSMENT — PAIN - FUNCTIONAL ASSESSMENT: PAIN_FUNCTIONAL_ASSESSMENT: 0-10

## 2022-05-13 NOTE — ED NOTES
To [facility] with complaints of headache, fever, sinus congestion. Started yesterday. Fever up to 103. No cough, no known ill exposures. Swabbed for flu.       Fernanda Lee RN  05/13/22 0072

## 2022-05-13 NOTE — ED PROVIDER NOTES
[facility]       CHIEF COMPLAINT       Chief Complaint   Patient presents with    Headache    Fever    Back Pain    Nasal Congestion       Nurses Notes reviewed and I agree except as noted in the HPI. HISTORY OF PRESENT ILLNESS   Jonathan Babin is a 55 y.o. male who presents for complaints of fever, body aches and chills. Symptoms x2 days. States he is fatigued. Significant other states he acts like he is \"out of it \"at times. No cough or shortness of breath. Patient does have sinus congestion and pressure. No sore throat. Urine has been decreased. Denies abdominal pain. No nausea, vomiting or diarrhea. HPI    REVIEW OF SYSTEMS     Review of Systems   Unable to perform ROS: Mental status change   Constitutional: Positive for diaphoresis, fatigue and fever. HENT: Positive for congestion, rhinorrhea, sinus pressure and sinus pain. Negative for sore throat. Respiratory: Negative for cough and shortness of breath. Gastrointestinal: Negative for abdominal pain. Genitourinary: Positive for decreased urine volume. Negative for dysuria and hematuria. Neurological: Positive for dizziness and headaches. Psychiatric/Behavioral: Negative for behavioral problems. PAST MEDICAL HISTORY         Diagnosis Date    Gout     Migraine        SURGICALHISTORY     Patient  has a past surgical history that includes fracture surgery; Testicle torsion reduction; and Nose surgery.     CURRENT MEDICATIONS       Previous Medications    ACETAMINOPHEN (TYLENOL) 500 MG TABLET    Take 1 tablet by mouth every 4 hours as needed for Pain or Fever    ALBUTEROL SULFATE  (90 BASE) MCG/ACT INHALER    Inhale 2 puffs into the lungs every 4 hours as needed for Wheezing or Shortness of Breath    ALLOPURINOL (ZYLOPRIM) 100 MG TABLET    Take 1 tablet by mouth daily    INDOMETHACIN (INDOCIN) 50 MG CAPSULE    Take 1 capsule by mouth 2 times daily (with meals) for 7 days KETOROLAC (TORADOL) 10 MG TABLET    Take 1 tablet by mouth every 6 hours as needed for Pain    NAPROXEN (NAPROSYN) 500 MG TABLET    Take 1 tablet by mouth 2 times daily as needed for Pain    OXYMETAZOLINE (AFRIN 12 HOUR) 0.05 % NASAL SPRAY    2 sprays by Nasal route 2 times daily. RESPIRATORY THERAPY SUPPLIES (NEBULIZER COMPRESSOR) KIT    1 kit by Does not apply route once for 1 dose       ALLERGIES     Patient is is allergic to shellfish-derived products. Patients   Immunization History   Administered Date(s) Administered    Influenza Virus Vaccine 11/08/2016    Tdap (Boostrix, Adacel) 11/22/2011, 03/31/2021       FAMILY HISTORY     Patient's family history includes Diabetes in his father and mother; Heart Disease in his mother; High Blood Pressure in his father and mother. SOCIAL HISTORY     Patient  reports that he has been smoking cigars. He has been smoking about 0.00 packs per day. He has never used smokeless tobacco. He reports previous alcohol use. He reports that he does not use drugs. PHYSICAL EXAM     ED TRIAGE VITALS  BP: 130/78, Temp: 102.1 °F (38.9 °C), Pulse: 120, Resp: 16, SpO2: 95 %,Estimated body mass index is 40.44 kg/m² as calculated from the following:    Height as of this encounter: 6' 2\" (1.88 m). Weight as of this encounter: 315 lb (142.9 kg). ,No LMP for male patient. Physical Exam  Constitutional:       Appearance: He is ill-appearing. HENT:      Right Ear: Tympanic membrane, ear canal and external ear normal.      Left Ear: Tympanic membrane, ear canal and external ear normal.      Nose: Congestion present. Mouth/Throat:      Mouth: Mucous membranes are moist.   Cardiovascular:      Rate and Rhythm: Tachycardia present. Heart sounds: Normal heart sounds. Pulmonary:      Effort: Pulmonary effort is normal. No respiratory distress. Breath sounds: No wheezing. Abdominal:      General: Abdomen is flat. Bowel sounds are normal. There is no distension. Palpations: Abdomen is soft. Tenderness: There is no abdominal tenderness. Skin:     General: Skin is warm and dry. Neurological:      General: No focal deficit present. Psychiatric:         Mood and Affect: Mood normal.         Behavior: Behavior normal.         DIAGNOSTIC RESULTS     Labs:  Results for orders placed or performed during the hospital encounter of 05/13/22   Rapid influenza A/B antigens   Result Value Ref Range    Flu A Antigen Negative NEGATIVE    Flu B Antigen Negative NEGATIVE   COVID-19, Rapid   Result Value Ref Range    SARS-CoV-2, CORTEZ NOT  DETECTED NOT DETECTED   Urinalysis   Result Value Ref Range    Glucose, Ur Negative NEGATIVE mg/dl    Bilirubin Urine Negative NEGATIVE    Ketones, Urine Negative NEGATIVE    Specific Gravity, UA 1.025 1.002 - 1.030    Blood, Urine Small (A) NEGATIVE    pH, UA 5.50 5.0 - 9.0    Protein,  (A) NEGATIVE mg/dl    Urobilinogen, Urine 0.20 0.2 - 1.0 eu/dl    Nitrite, Urine Negative NEGATIVE    Leukocyte Esterase, Urine Negative NEGATIVE    Color, UA Yellow STRAW-YELLOW    Character, Urine Clear CLEAR-SL CLOUD       IMAGING:    XR CHEST (2 VW)    (Results Pending)         EKG:      URGENT CARE COURSE:     Vitals:    05/13/22 0937 05/13/22 1041   BP: 130/78    Pulse: 120    Resp: 16    Temp: 102.6 °F (39.2 °C) 102.1 °F (38.9 °C)   TempSrc: Temporal Temporal   SpO2: 95%    Weight: (!) 315 lb (142.9 kg)    Height: 6' 2\" (1.88 m)        Medications   acetaminophen (TYLENOL) tablet 975 mg (975 mg Oral Given 5/13/22 0952)   ibuprofen (ADVIL;MOTRIN) tablet 800 mg (800 mg Oral Given 5/13/22 1045)            PROCEDURES:  None    FINAL IMPRESSION      1. Fever, unknown origin    2. Tachycardia    3. Somnolence          DISPOSITION/ PLAN     Patient presents for 1-2.6 fever. COVID, influenza, urinalysis all negative. Chest x-ray performed with pending results. Patient received Tylenol 975 mg and temperature only decreased to 102. 1.   He was then administered ibuprofen 800 mg orally. Patient seems a little confused and very somnolent. He is tachycardic at 120. This is likely fever related but with current symptoms feel patient needs SIRS/septic work-up. I discussed this with the patient and significant other. They are amenable to transfer. They prefer [facility].  I contacted Dr. Shira Landrum and advised of the patient transfer. He graciously excepted. Patient and significant other declined ambulance transport and states they will drive per private vehicle. He is advised to go directly to the emergency department with no stops. PATIENT REFERRED TO:  Tyson Lyons MD  [id number] [facility] / [address] [place] [address]      DISCHARGE MEDICATIONS:  New Prescriptions    No medications on file       Discontinued Medications    AZITHROMYCIN (ZITHROMAX Z-HODA) 250 MG TABLET    Take 2 tablets (500 mg) on Day 1, and then take 1 tablet (250 mg) on days 2 through 5.        Current Discharge Medication List          NEREYDA Padilla CNP    (Please note that portions of this note were completed with a voice recognition program. Efforts were made to edit the dictations but occasionally words are mis-transcribed.)          NEREYDA Padilla CNP  05/13/22 1465

## 2023-03-07 ENCOUNTER — APPOINTMENT (OUTPATIENT)
Dept: GENERAL RADIOLOGY | Age: 48
End: 2023-03-07
Payer: OTHER GOVERNMENT

## 2023-03-07 ENCOUNTER — HOSPITAL ENCOUNTER (EMERGENCY)
Age: 48
Discharge: HOME | End: 2023-03-07
Payer: OTHER GOVERNMENT

## 2023-03-07 VITALS
HEART RATE: 110 BPM | WEIGHT: 300 LBS | SYSTOLIC BLOOD PRESSURE: 131 MMHG | DIASTOLIC BLOOD PRESSURE: 71 MMHG | OXYGEN SATURATION: 96 % | TEMPERATURE: 98.8 F | HEIGHT: 74 IN | BODY MASS INDEX: 38.5 KG/M2 | RESPIRATION RATE: 18 BRPM

## 2023-03-07 DIAGNOSIS — J01.90 ACUTE SINUSITIS, RECURRENCE NOT SPECIFIED, UNSPECIFIED LOCATION: Primary | ICD-10-CM

## 2023-03-07 DIAGNOSIS — D80.4 IGM DEFICIENCY (HCC): ICD-10-CM

## 2023-03-07 LAB
ALBUMIN SERPL BCG-MCNC: 4.3 G/DL (ref 3.5–5.1)
ALP SERPL-CCNC: 138 U/L (ref 38–126)
ALT SERPL W/O P-5'-P-CCNC: 95 U/L (ref 11–66)
ANION GAP SERPL CALC-SCNC: 14 MEQ/L (ref 8–16)
AST SERPL-CCNC: 86 U/L (ref 5–40)
BASOPHILS ABSOLUTE: 0.1 THOU/MM3 (ref 0–0.1)
BASOPHILS NFR BLD AUTO: 0.5 %
BILIRUB CONJ SERPL-MCNC: < 0.2 MG/DL (ref 0–0.3)
BILIRUB SERPL-MCNC: 0.4 MG/DL (ref 0.3–1.2)
BUN SERPL-MCNC: 18 MG/DL (ref 7–22)
CALCIUM SERPL-MCNC: 9.2 MG/DL (ref 8.5–10.5)
CHLORIDE SERPL-SCNC: 99 MEQ/L (ref 98–111)
CO2 SERPL-SCNC: 21 MEQ/L (ref 23–33)
CREAT SERPL-MCNC: 1.3 MG/DL (ref 0.4–1.2)
DEPRECATED RDW RBC AUTO: 39.4 FL (ref 35–45)
EOSINOPHIL NFR BLD AUTO: 1.9 %
EOSINOPHILS ABSOLUTE: 0.2 THOU/MM3 (ref 0–0.4)
ERYTHROCYTE [DISTWIDTH] IN BLOOD BY AUTOMATED COUNT: 12 % (ref 11.5–14.5)
FLUAV RNA RESP QL NAA+PROBE: NOT DETECTED
FLUBV RNA RESP QL NAA+PROBE: NOT DETECTED
GFR SERPL CREATININE-BSD FRML MDRD: > 60 ML/MIN/1.73M2
GLUCOSE SERPL-MCNC: 141 MG/DL (ref 70–108)
HCT VFR BLD AUTO: 43.7 % (ref 42–52)
HGB BLD-MCNC: 13.9 GM/DL (ref 14–18)
IMM GRANULOCYTES # BLD AUTO: 0.03 THOU/MM3 (ref 0–0.07)
IMM GRANULOCYTES NFR BLD AUTO: 0.3 %
LACTATE SERPL-SCNC: 2.9 MMOL/L (ref 0.5–2)
LIPASE SERPL-CCNC: 47.6 U/L (ref 5.6–51.3)
LYMPHOCYTES ABSOLUTE: 2.5 THOU/MM3 (ref 1–4.8)
LYMPHOCYTES NFR BLD AUTO: 25.2 %
MCH RBC QN AUTO: 28.5 PG (ref 26–33)
MCHC RBC AUTO-ENTMCNC: 31.8 GM/DL (ref 32.2–35.5)
MCV RBC AUTO: 89.7 FL (ref 80–94)
MONOCYTES ABSOLUTE: 0.7 THOU/MM3 (ref 0.4–1.3)
MONOCYTES NFR BLD AUTO: 6.9 %
NEUTROPHILS NFR BLD AUTO: 65.2 %
NRBC BLD AUTO-RTO: 0 /100 WBC
OSMOLALITY SERPL CALC.SUM OF ELEC: 272.5 MOSMOL/KG (ref 275–300)
PLATELET # BLD AUTO: 245 THOU/MM3 (ref 130–400)
PMV BLD AUTO: 8.7 FL (ref 9.4–12.4)
POTASSIUM SERPL-SCNC: 4.1 MEQ/L (ref 3.5–5.2)
PROCALCITONIN SERPL IA-MCNC: 0.32 NG/ML (ref 0.01–0.09)
PROT SERPL-MCNC: 7.5 G/DL (ref 6.1–8)
RBC # BLD AUTO: 4.87 MILL/MM3 (ref 4.7–6.1)
SARS-COV-2 RNA RESP QL NAA+PROBE: NOT DETECTED
SEGMENTED NEUTROPHILS ABSOLUTE COUNT: 6.5 THOU/MM3 (ref 1.8–7.7)
SODIUM SERPL-SCNC: 134 MEQ/L (ref 135–145)
WBC # BLD AUTO: 10 THOU/MM3 (ref 4.8–10.8)

## 2023-03-07 PROCEDURE — 2580000003 HC RX 258: Performed by: NURSE PRACTITIONER

## 2023-03-07 PROCEDURE — 84145 PROCALCITONIN (PCT): CPT

## 2023-03-07 PROCEDURE — 6370000000 HC RX 637 (ALT 250 FOR IP): Performed by: NURSE PRACTITIONER

## 2023-03-07 PROCEDURE — 83605 ASSAY OF LACTIC ACID: CPT

## 2023-03-07 PROCEDURE — 80053 COMPREHEN METABOLIC PANEL: CPT

## 2023-03-07 PROCEDURE — 83690 ASSAY OF LIPASE: CPT

## 2023-03-07 PROCEDURE — 96365 THER/PROPH/DIAG IV INF INIT: CPT

## 2023-03-07 PROCEDURE — 6360000002 HC RX W HCPCS: Performed by: NURSE PRACTITIONER

## 2023-03-07 PROCEDURE — 87040 BLOOD CULTURE FOR BACTERIA: CPT

## 2023-03-07 PROCEDURE — 87636 SARSCOV2 & INF A&B AMP PRB: CPT

## 2023-03-07 PROCEDURE — 2500000003 HC RX 250 WO HCPCS: Performed by: NURSE PRACTITIONER

## 2023-03-07 PROCEDURE — 71045 X-RAY EXAM CHEST 1 VIEW: CPT

## 2023-03-07 PROCEDURE — 82248 BILIRUBIN DIRECT: CPT

## 2023-03-07 PROCEDURE — 99284 EMERGENCY DEPT VISIT MOD MDM: CPT

## 2023-03-07 PROCEDURE — 85025 COMPLETE CBC W/AUTO DIFF WBC: CPT

## 2023-03-07 PROCEDURE — 36415 COLL VENOUS BLD VENIPUNCTURE: CPT

## 2023-03-07 RX ORDER — AMOXICILLIN AND CLAVULANATE POTASSIUM 875; 125 MG/1; MG/1
1 TABLET, FILM COATED ORAL 2 TIMES DAILY
Qty: 20 TABLET | Refills: 0 | Status: SHIPPED | OUTPATIENT
Start: 2023-03-07 | End: 2023-03-17

## 2023-03-07 RX ORDER — IBUPROFEN 800 MG/1
800 TABLET ORAL ONCE
Status: COMPLETED | OUTPATIENT
Start: 2023-03-07 | End: 2023-03-07

## 2023-03-07 RX ORDER — 0.9 % SODIUM CHLORIDE 0.9 %
1000 INTRAVENOUS SOLUTION INTRAVENOUS ONCE
Status: COMPLETED | OUTPATIENT
Start: 2023-03-07 | End: 2023-03-07

## 2023-03-07 RX ADMIN — PHENYLEPHRINE HYDROCHLORIDE 1 SPRAY: 0.5 SPRAY NASAL at 19:30

## 2023-03-07 RX ADMIN — SODIUM CHLORIDE 1000 ML: 9 INJECTION, SOLUTION INTRAVENOUS at 19:55

## 2023-03-07 RX ADMIN — SODIUM CHLORIDE 1000 ML: 9 INJECTION, SOLUTION INTRAVENOUS at 22:05

## 2023-03-07 RX ADMIN — PIPERACILLIN AND TAZOBACTAM 4500 MG: 4; .5 INJECTION, POWDER, FOR SOLUTION INTRAVENOUS at 22:03

## 2023-03-07 RX ADMIN — IBUPROFEN 800 MG: 800 TABLET, FILM COATED ORAL at 19:29

## 2023-03-07 ASSESSMENT — PAIN - FUNCTIONAL ASSESSMENT
PAIN_FUNCTIONAL_ASSESSMENT: NONE - DENIES PAIN
PAIN_FUNCTIONAL_ASSESSMENT: 0-10

## 2023-03-07 ASSESSMENT — PAIN SCALES - GENERAL: PAINLEVEL_OUTOF10: 4

## 2023-03-08 NOTE — DISCHARGE INSTRUCTIONS
Call today or tomorrow to follow up with Aicha Mancuso MD  in 2 days. Use over the counter nasal spray every 2 - 3 hours to both nostrils to keep your sinuses clean. Obtain over the counter medication called guaifenesin (Robitussen / Mucinex) to help clear out the drainage. Use any over the counter sinus medication (claritan / loratidine / zyrtec)    Nonprescription saline nasal sprays and nose drops can be used to keep your nasal tissues moist, relieve nasal irritation and help thick or dried mucus to drain. Saline nose drops can be purchased over the counter or can be made easily at home:    Step 1 - Boil tap water for 10 minutes to remove all of the impurities and get the water hot enough to dissolve the salt. Pour one cup of the boiling hot water into a liquid measuring cup with a spout (to pour easily later). Step 2 - Add one teaspoon of baking soda and one teaspoon of sea salt to the boiling water in the measuring cup. Stir until the water is completely clear again, which insures that the baking soda and salt dissolve completely. Step 3 - Allow the water to cool to room temperature. Then, pour the water into a clean nasal spray bottle (available at [email or web address]). While lying on the bed with your head hanging over the side of the bed, squeeze the saline nasal spray one to three times into each nostril, inhaling through the nose as you squeeze in the fluid. If you do not have a dropper, then use a bulb syringe and gently squirt the solution into your nose, or snuff the solution from the palm of your hand, one nostril at a time. Step 4 - Store the remaining saline nasal spray in glass jar or plastic container with a lid. Refill your nasal spray bottle as needed. Make a fresh solution every 3 days. Use the saline spray, or obtain a humidifier for your bed room.     Take your medication as indicated, if you are given an antibiotic then make sure you get the prescription filled and take the antibiotics until finished. Drink plenty of water while taking the antibiotics. Avoid drinking alcohol while taking antibiotics. [employer], [facility] has some antibiotics for free; [facility] and [facility] has a 4 dollar prescription plan for some antibiotics. Return to the Emergency Department for worsening of cough, fever > 101.5 and not controlled with Tylenol or Ibuprofen, excessive nausea or vomiting, worsening of nasal discharge, any other care or concern.

## 2023-03-08 NOTE — ED NOTES
Pt resting on cot with family at bedside. Pt denies a need for anything else at this time.       Randell Denton RN  03/07/23 193

## 2023-03-08 NOTE — ED NOTES
Pt medicated per [facility] and updated from provider. Pt expresses understanding.      Ruben Beaver RN  03/07/23 8934

## 2023-03-08 NOTE — ED NOTES
Pt to ED via private vehicle w/rprts of sinus infection, nasal congestion and fever that started today around 2pm. Pt rprts generalized chills.  Rprts taking tylenol at [id number] [facility], RN  03/07/23 6966

## 2023-03-12 LAB
BACTERIA BLD AEROBE CULT: NORMAL
BACTERIA BLD AEROBE CULT: NORMAL

## 2023-03-13 NOTE — ED PROVIDER NOTES
[facility] [address] [address] EMERGENCY DEPT      EMERGENCY MEDICINE     Pt Name: Isaac Rao  MRN: [id number]  [place] 1975  Date of evaluation: 3/7/2023  Provider: NEREYDA Esqueda CNP    CHIEF COMPLAINT       Chief Complaint   Patient presents with    Sinusitis    Nasal Congestion     HISTORY OF PRESENT ILLNESS   Isaac Rao is a pleasant 52 y.o. male who presents to the emergency department from home with c/o sinus congestion, fever, chills, fatigue, body aches, cough. Patient has an IgM deficiency. Patient has seen Dr. Gerard Acevedo because the last time he was ill with similar symptoms he progressed to pneumonia very quickly. Took some Tylenol earlier for the fever. Symptoms started today. Patient's friend called his wife because he was texting him and the messages were just not making sense. Wife got home from work and checked on him and found him in bed wearing full clothing with a sweatshirt and a jacket under the cover shivering.       History is obtained from:  patient  PASTMEDICAL HISTORY     Past Medical History:   Diagnosis Date    Gout     Migraine        Patient Active Problem List   Diagnosis Code    Headache R51.9     SURGICAL HISTORY       Past Surgical History:   Procedure Laterality Date    FRACTURE SURGERY      nose    NOSE SURGERY      TESTICLE TORSION REDUCTION         CURRENT MEDICATIONS       Discharge Medication List as of 3/7/2023  9:21 PM        CONTINUE these medications which have NOT CHANGED    Details   albuterol sulfate  (90 Base) MCG/ACT inhaler Inhale 2 puffs into the lungs every 4 hours as needed for Wheezing or Shortness of Breath, Disp-18 g, R-0Normal      naproxen (NAPROSYN) 500 MG tablet Take 1 tablet by mouth 2 times daily as needed for Pain, Disp-14 tablet, R-0Normal      indomethacin (INDOCIN) 50 MG capsule Take 1 capsule by mouth 2 times daily (with meals) for 7 days, Disp-14 capsule, R-0Normal      ketorolac (TORADOL) 10 MG tablet Take 1 tablet by mouth every 6 hours as needed for Pain, Disp-20 tablet, R-0Print      acetaminophen (TYLENOL) 500 MG tablet Take 1 tablet by mouth every 4 hours as needed for Pain or Fever, Disp-20 tablet, R-0Normal      Respiratory Therapy Supplies (NEBULIZER COMPRESSOR) KIT ONCE Starting Sat 11/2/2019, For 1 dose, Disp-1 kit, R-0, Print      allopurinol (ZYLOPRIM) 100 MG tablet Take 1 tablet by mouth daily, Disp-30 tablet, R-3      oxymetazoline (AFRIN 12 HOUR) 0.05 % nasal spray 2 sprays by Nasal route 2 times daily. ALLERGIES     is allergic to shellfish-derived products. FAMILY HISTORY     He indicated that his mother is alive. He indicated that his father is alive. SOCIAL HISTORY       Social History     Tobacco Use    Smoking status: Some Days     Packs/day: 0.00     Types: Cigars, Cigarettes    Smokeless tobacco: Never   Vaping Use    Vaping Use: Never used   Substance Use Topics    Alcohol use: Not Currently    Drug use: No       PHYSICAL EXAM       ED Triage Vitals [03/07/23 1854]   BP Temp Temp Source Heart Rate Resp SpO2 Height Weight   133/74 (!) 101.1 °F (38.4 °C) Oral (!) 106 18 96 % 6' 2\" (1.88 m) 300 lb (136.1 kg)       Physical Exam  Vitals and nursing note reviewed. Constitutional:       General: He is not in acute distress. Appearance: Normal appearance. He is well-developed. He is obese. He is ill-appearing. He is not diaphoretic. HENT:      Head: Normocephalic and atraumatic. Nose: Congestion and rhinorrhea present. Mouth/Throat:      Mouth: Mucous membranes are moist.      Pharynx: Oropharynx is clear. Eyes:      General:         Right eye: No discharge. Left eye: No discharge. Conjunctiva/sclera: Conjunctivae normal.   Neck:      Trachea: No tracheal deviation. Cardiovascular:      Rate and Rhythm: Normal rate and regular rhythm. Pulses: Normal pulses. Heart sounds: Normal heart sounds. No murmur heard. No gallop.       Comments: Normal capillary refill  Pulmonary:      Effort: Pulmonary effort is normal. No respiratory distress. Breath sounds: Normal breath sounds. No stridor. Abdominal:      General: Bowel sounds are normal.      Palpations: Abdomen is soft. Musculoskeletal:         General: No tenderness or deformity. Normal range of motion. Cervical back: Normal range of motion. Skin:     General: Skin is warm and dry. Capillary Refill: Capillary refill takes less than 2 seconds. Coloration: Skin is not pale. Findings: No erythema or rash. Neurological:      General: No focal deficit present. Mental Status: He is alert and oriented to person, place, and time. Cranial Nerves: No cranial nerve deficit. Psychiatric:         Behavior: Behavior normal.       FORMAL DIAGNOSTIC RESULTS     RADIOLOGY: Interpretation per the Radiologist below, if available at the time of this note (none if blank):    XR CHEST PORTABLE   Final Result      No acute intrathoracic process. **This report has been created using voice recognition software. It may contain minor errors which are inherent in voice recognition technology. **      Final report electronically signed by Dr. Lisette Douglass on 3/7/2023 7:12 PM          LABS: (none if blank)  Labs Reviewed   CBC WITH AUTO DIFFERENTIAL - Abnormal; Notable for the following components:       Result Value    Hemoglobin 13.9 (*)     MCHC 31.8 (*)     MPV 8.7 (*)     All other components within normal limits   BASIC METABOLIC PANEL - Abnormal; Notable for the following components:    Sodium 134 (*)     CO2 21 (*)     Glucose 141 (*)     Creatinine 1.3 (*)     All other components within normal limits   LACTIC ACID - Abnormal; Notable for the following components:    Lactic Acid 2.9 (*)     All other components within normal limits   PROCALCITONIN - Abnormal; Notable for the following components:    Procalcitonin 0.32 (*)     All other components within normal limits   HEPATIC FUNCTION PANEL - Abnormal; Notable for the following components:    Alkaline Phosphatase 138 (*)     AST 86 (*)     ALT 95 (*)     All other components within normal limits   OSMOLALITY - Abnormal; Notable for the following components:    Osmolality Calc 272.5 (*)     All other components within normal limits   COVID-19 & INFLUENZA COMBO   CULTURE, BLOOD 1    Narrative:     Source: blood-Adult-suboptimal <5.5oz./set volume       Site: Peripheral Vein            Current Antibiotics: not stated   CULTURE, BLOOD 2    Narrative:     Source: blood-Adult-suboptimal <5.5oz./set volume       Site: (single bottle)Peripheral;            Current Antibiotics: not stated   LIPASE   ANION GAP   GLOMERULAR FILTRATION RATE, ESTIMATED   URINALYSIS WITH REFLEX TO CULTURE       (Any cultures that may have been sent were not resulted at the time of this patient visit)    [address] / ED COURSE:     Summary of Patient Presentation:      1) Number and Complexity of Problems            Problem List This Visit:         Chief Complaint   Patient presents with    Sinusitis    Nasal Congestion             Differential Diagnosis includes (but not limited to):  flu, strep, PNA, bronchitis, viral illness          Diagnoses Considered but I have low suspicion of:                 Pertinent Comorbid Conditions:    IGM deficiency    2)  Data Reviewed (none if left blank, additional information can be found in the ED course)          My Independent interpretations:     EKG:      None    Imaging: Chest x-ray shows no intrathoracic process, patient is only been ill for about 12 hours so imaging would have delayed findings    Labs:      Normal white count, initial lactic is 2.9 with a Pro-Cholo of 0.32 I did not repeat the lactic because they gave him 2 L of fluid and patient was feeling much better and sepsis is not apparent. .                 Decision Rules/Clinical Scores utilized:                            External Documentation Reviewed: Previous patient encounter documents & history available on EMR was reviewed              See Formal Diagnostic Results above for the lab and radiology tests and orders. 3)  Treatment and Disposition         ED Reassessment: Improved, fever has come down, patient is breathing better after Afrin. Patient is given a dose of Zosyn due to the IgM deficiency         Case discussed with consulting clinician/attending physician: Dr. Robin Laws         Shared Decision-Making was performed and disposition discussed with the       Patient/Family and questions answered          Social determinants of health impacting treatment or disposition:            Code Status:  Full        MDM    Vitals Reviewed:    Vitals:    03/07/23 1930 03/07/23 1936 03/07/23 2004 03/07/23 2205   BP: (!) 144/88 131/78  131/71   Pulse: (!) 107   (!) 110   Resp: 20   18   Temp:   98.8 °F (37.1 °C)    TempSrc:   Oral    SpO2: 96% 93%  96%   Weight:       Height:           The patient was seen and examined. Appropriate diagnostic testing was performed and results reviewed with the patient. Upon arrival patient was ill-appearing. Febrile, lots of sinus congestion. Appropriate labs and imaging are ordered and reviewed. Greater concerned because after chart review I realized the patient has an IgM deficiency making him more susceptible to bacterial infections. Is hydrated with 2 L of IV fluids with improvement. He is given some Afrin. Ibuprofen reduce the fever. Patient is stable at this point. No signs of sepsis. I discussed findings with the patient and his wife as well as my attending. Did give him a dose of Zosyn and will discharge home on broad-spectrum antibiotics. Patient is comfortable this plan of care. Strict return precautions are provided to the patient and his wife. Discharged home      The results of pertinent diagnostic studies and exam findings were discussed.  The patients provisional diagnosis and plan of care were discussed with the patient and present family who expressed understanding and agreement with the POC. Any medications were reviewed and indications and risks of medications were discussed with the patient /family present. Strict verbal and written return precautions, instructions and appropriate follow-up provided to  the patient. Patient was DISCHARGED from the hospital. Based on the reassuring ED workup and patient's stable vital signs, I feel the patient may be safely discharged home. At this point in time, I believe the patient has the mental capacity to make medical decisions. No notes of EC Admission Criteria type on file. Please note that the patient was evaluated during a pandemic. All efforts were made for HIPPA compliance as well as provision of appropriate care. Patient was seen independently by myself. The patient's final impression and disposition and plan was determined by myself. Strict return precautions and follow up instructions were discussed with the patient prior to discharge, with which the patient agrees. Physical assessment findings, diagnostic testing(s) if applicable, and vital signs reviewed with patient/patient representative. Questions answered. Medications asdirected, including OTC medications for supportive care. Education provided on medications. Differential diagnosis(s) discussed with patient/patient representative. Home care/self care instructions reviewed withpatient/patient representative. Patient is to follow-up with family care provider in 2-3 days if no improvement. Patient is to go to the emergency department if symptoms worsen. Patient/patient representative isaware of care plan, questions answered, verbalizes understanding and is in agreement.      ED Medications administered this visit:  (None if blank)  Medications   ibuprofen (ADVIL;MOTRIN) tablet 800 mg (800 mg Oral Given 3/7/23 1929)   phenylephrine (TRUNG-SYNEPHRINE) 0.5 % nasal spray 1 spray (1 spray Each Nostril Given 3/7/23 1930)   0.9 % sodium chloride bolus (0 mLs IntraVENous Stopped 3/7/23 2205)   0.9 % sodium chloride bolus (0 mLs IntraVENous Stopped 3/7/23 2320)   piperacillin-tazobactam (ZOSYN) 4,500 mg in sodium chloride 0.9 % 100 mL IVPB (mini-bag) (0 mg IntraVENous Stopped 3/7/23 2321)         CONSULTS:  None    PROCEDURES: (None if blank)  Procedures:     CRITICAL CARE: (None if blank)      DISCHARGE PRESCRIPTIONS: (None if blank)  Discharge Medication List as of 3/7/2023  9:21 PM        START taking these medications    Details   amoxicillin-clavulanate (AUGMENTIN) 875-125 MG per tablet Take 1 tablet by mouth 2 times daily for 10 days, Disp-20 tablet, R-0Normal             FINAL IMPRESSION      1. Acute sinusitis, recurrence not specified, unspecified location    2.  IgM deficiency [facility])          DISPOSITION/PLAN   DISPOSITION Decision To Discharge 03/07/2023 11:19:48 PM      OUTPATIENT FOLLOW UP THE PATIENT:  Girtha Landau, MD  [address] [phone]    Schedule an appointment as soon as possible for a visit in 2 days  For follow up    Bryce Pérez MD  [address] [place] [address]  [address] [address] [phone]    Schedule an appointment as soon as possible for a visit in 2 days  For follow up      Kathya Paniagua, NEREYDA - [facility], NEREYDA - [place]  03/13/23 2430

## 2023-04-13 ENCOUNTER — HOSPITAL ENCOUNTER (EMERGENCY)
Age: 48
Discharge: HOME | End: 2023-04-13
Payer: OTHER GOVERNMENT

## 2023-04-13 VITALS
HEIGHT: 74 IN | HEART RATE: 92 BPM | RESPIRATION RATE: 18 BRPM | OXYGEN SATURATION: 96 % | DIASTOLIC BLOOD PRESSURE: 94 MMHG | TEMPERATURE: 97.4 F | BODY MASS INDEX: 40.04 KG/M2 | SYSTOLIC BLOOD PRESSURE: 143 MMHG | WEIGHT: 312 LBS

## 2023-04-13 DIAGNOSIS — M10.9 ACUTE GOUT OF LEFT FOOT, UNSPECIFIED CAUSE: Primary | ICD-10-CM

## 2023-04-13 PROCEDURE — 99213 OFFICE O/P EST LOW 20 MIN: CPT

## 2023-04-13 PROCEDURE — 99213 OFFICE O/P EST LOW 20 MIN: CPT | Performed by: NURSE PRACTITIONER

## 2023-04-13 RX ORDER — COLCHICINE 0.6 MG/1
0.6 TABLET ORAL DAILY
Qty: 7 TABLET | Refills: 0 | Status: SHIPPED | OUTPATIENT
Start: 2023-04-13 | End: 2023-04-20

## 2023-04-13 ASSESSMENT — PAIN DESCRIPTION - DESCRIPTORS: DESCRIPTORS: SHARP;STABBING

## 2023-04-13 ASSESSMENT — PAIN DESCRIPTION - LOCATION: LOCATION: FOOT

## 2023-04-13 ASSESSMENT — PAIN DESCRIPTION - ORIENTATION: ORIENTATION: LEFT

## 2023-04-13 ASSESSMENT — PAIN - FUNCTIONAL ASSESSMENT: PAIN_FUNCTIONAL_ASSESSMENT: 0-10

## 2023-04-13 ASSESSMENT — PAIN SCALES - GENERAL: PAINLEVEL_OUTOF10: 8

## 2023-04-13 NOTE — ED PROVIDER NOTES
[facility]  Urgent Care Encounter       CHIEF COMPLAINT       Chief Complaint   Patient presents with    Foot Pain     left       Nurses Notes reviewed and I agree except as noted in the HPI. HISTORY OF PRESENT ILLNESS   Trace Jacobs is a 52 y.o. male who presents with complaints of left foot pain that started 2 to 3 days ago. Patient states his symptoms have worsened over the past day. He does admit over [place] eating high salty foods including cured meat, and red meats. Patient denies any alcohol usage. Bearing weight and palpation of his left foot seems to make the pain worse. He describes the pain rated 8 out of 10 and sharp. He does have a history of gout and is on allopurinol. The history is provided by the patient. REVIEW OF SYSTEMS     Review of Systems   Constitutional:  Negative for activity change. Cardiovascular:  Negative for chest pain. Musculoskeletal:  Positive for arthralgias (left ankle), joint swelling and myalgias (left foot). Skin:  Negative for rash and wound. Neurological:  Negative for weakness and numbness. PAST MEDICAL HISTORY         Diagnosis Date    Gout     Migraine        SURGICALHISTORY     Patient  has a past surgical history that includes fracture surgery; Testicle torsion reduction; and Nose surgery.     CURRENT MEDICATIONS       Previous Medications    ACETAMINOPHEN (TYLENOL) 500 MG TABLET    Take 1 tablet by mouth every 4 hours as needed for Pain or Fever    ALBUTEROL SULFATE  (90 BASE) MCG/ACT INHALER    Inhale 2 puffs into the lungs every 4 hours as needed for Wheezing or Shortness of Breath    ALLOPURINOL (ZYLOPRIM) 100 MG TABLET    Take 1 tablet by mouth daily    INDOMETHACIN (INDOCIN) 50 MG CAPSULE    Take 1 capsule by mouth 2 times daily (with meals) for 7 days    KETOROLAC (TORADOL) 10 MG TABLET    Take 1 tablet by mouth every 6 hours as needed for Pain    NAPROXEN (NAPROSYN) 500 MG TABLET    Take 1 tablet by

## 2023-04-13 NOTE — ED NOTES
Pt with complaints of left foot pain that started on Monday. States he believes its a gout flare up. States heat makes pain worse but ice helps with pain.      Norm Mckeon LPN  69/14/46 1573

## 2023-07-09 ENCOUNTER — HOSPITAL ENCOUNTER (EMERGENCY)
Age: 48
Discharge: HOME | End: 2023-07-09
Payer: COMMERCIAL

## 2023-07-09 VITALS
TEMPERATURE: 99.9 F | HEART RATE: 102 BPM | RESPIRATION RATE: 18 BRPM | DIASTOLIC BLOOD PRESSURE: 87 MMHG | OXYGEN SATURATION: 96 % | SYSTOLIC BLOOD PRESSURE: 145 MMHG

## 2023-07-09 DIAGNOSIS — H65.92 LEFT NON-SUPPURATIVE OTITIS MEDIA: Primary | ICD-10-CM

## 2023-07-09 PROCEDURE — 6370000000 HC RX 637 (ALT 250 FOR IP): Performed by: NURSE PRACTITIONER

## 2023-07-09 PROCEDURE — 99283 EMERGENCY DEPT VISIT LOW MDM: CPT

## 2023-07-09 RX ORDER — OXYMETAZOLINE HYDROCHLORIDE 0.05 G/100ML
2 SPRAY NASAL ONCE
Status: COMPLETED | OUTPATIENT
Start: 2023-07-10 | End: 2023-07-09

## 2023-07-09 RX ORDER — AMOXICILLIN AND CLAVULANATE POTASSIUM 875; 125 MG/1; MG/1
1 TABLET, FILM COATED ORAL EVERY 12 HOURS SCHEDULED
Status: DISCONTINUED | OUTPATIENT
Start: 2023-07-10 | End: 2023-07-09

## 2023-07-09 RX ORDER — AMOXICILLIN AND CLAVULANATE POTASSIUM 875; 125 MG/1; MG/1
1 TABLET, FILM COATED ORAL 2 TIMES DAILY
Qty: 20 TABLET | Refills: 0 | Status: SHIPPED | OUTPATIENT
Start: 2023-07-09 | End: 2023-07-19

## 2023-07-09 RX ORDER — AMOXICILLIN AND CLAVULANATE POTASSIUM 875; 125 MG/1; MG/1
1 TABLET, FILM COATED ORAL ONCE
Status: COMPLETED | OUTPATIENT
Start: 2023-07-10 | End: 2023-07-09

## 2023-07-09 RX ADMIN — AMOXICILLIN AND CLAVULANATE POTASSIUM 1 TABLET: 875; 125 TABLET, FILM COATED ORAL at 23:43

## 2023-07-09 RX ADMIN — OXYMETAZOLINE HCL 2 SPRAY: 0.05 SPRAY NASAL at 23:44

## 2023-07-10 NOTE — ED TRIAGE NOTES
Comes in to er with c/o fever at home and took 2 tylenol. Is having sinus and ear pressure that is making him feel sick.

## 2023-07-10 NOTE — ED PROVIDER NOTES
[facility] Emergency [address]       Chief Complaint   Patient presents with    Fever     Ear pressure and sinus pain       Nurses Notes reviewed and I agree except as noted in the HPI. HISTORY OF PRESENT ILLNESS   Joseph Loredo is a 50 y.o. male who presents to the ED for evaluation of fever, ear pain. Patient notes symptoms began yesterday. He notes fever developed today of 101 degrees. He reports congestion, and left ear pain. Reports decreased hearing. Denies cough, shortness of breath, nausea, vomiting. He reports sinus headache. He notes a past medical history of IgM deficiency, migraines, gout. He denies recent infections. HPI was provided by the patient. PAST MEDICAL HISTORY     Past Medical History:   Diagnosis Date    Gout     Migraine        SURGICALHISTORY      has a past surgical history that includes fracture surgery; Testicle torsion reduction; and Nose surgery.     CURRENT MEDICATIONS       Discharge Medication List as of 7/9/2023 11:35 PM        CONTINUE these medications which have NOT CHANGED    Details   colchicine (COLCRYS) 0.6 MG tablet Take 1 tablet by mouth daily for 7 days Take 2 tablets on first day, Disp-7 tablet, R-0Normal      albuterol sulfate  (90 Base) MCG/ACT inhaler Inhale 2 puffs into the lungs every 4 hours as needed for Wheezing or Shortness of Breath, Disp-18 g, R-0Normal      naproxen (NAPROSYN) 500 MG tablet Take 1 tablet by mouth 2 times daily as needed for Pain, Disp-14 tablet, R-0Normal      indomethacin (INDOCIN) 50 MG capsule Take 1 capsule by mouth 2 times daily (with meals) for 7 days, Disp-14 capsule, R-0Normal      ketorolac (TORADOL) 10 MG tablet Take 1 tablet by mouth every 6 hours as needed for Pain, Disp-20 tablet, R-0Print      acetaminophen (TYLENOL) 500 MG tablet Take 1 tablet by mouth every 4 hours as needed for Pain or Fever, Disp-20 tablet, R-0Normal      Respiratory Therapy Supplies (NEBULIZER COMPRESSOR)

## 2023-10-11 ENCOUNTER — HOSPITAL ENCOUNTER (OUTPATIENT)
Dept: ULTRASOUND IMAGING | Age: 48
Discharge: HOME | End: 2023-10-11
Attending: INTERNAL MEDICINE
Payer: OTHER GOVERNMENT

## 2023-10-11 DIAGNOSIS — R94.5 ABNORMAL RESULTS OF LIVER FUNCTION STUDIES: ICD-10-CM

## 2023-10-11 PROCEDURE — 76705 ECHO EXAM OF ABDOMEN: CPT

## 2024-01-03 ENCOUNTER — HOSPITAL ENCOUNTER (EMERGENCY)
Age: 49
Discharge: LEFT BEFORE DISCHARGE | End: 2024-01-03

## 2024-02-29 ENCOUNTER — HOSPITAL ENCOUNTER (EMERGENCY)
Age: 49
Discharge: HOME | End: 2024-02-29
Attending: EMERGENCY MEDICINE
Payer: OTHER GOVERNMENT

## 2024-02-29 VITALS
OXYGEN SATURATION: 95 % | BODY MASS INDEX: 40.04 KG/M2 | HEART RATE: 75 BPM | HEIGHT: 74 IN | TEMPERATURE: 97.9 F | RESPIRATION RATE: 16 BRPM | WEIGHT: 312 LBS | SYSTOLIC BLOOD PRESSURE: 138 MMHG | DIASTOLIC BLOOD PRESSURE: 93 MMHG

## 2024-02-29 DIAGNOSIS — E11.69 TYPE 2 DIABETES MELLITUS WITH OTHER SPECIFIED COMPLICATION, WITHOUT LONG-TERM CURRENT USE OF INSULIN (HCC): Primary | ICD-10-CM

## 2024-02-29 LAB
ANION GAP SERPL CALC-SCNC: 17 MEQ/L (ref 8–16)
B-OH-BUTYR SERPL-MSCNC: 2.44 MG/DL (ref 0.2–2.81)
BASE EXCESS BLDA CALC-SCNC: -3.8 MMOL/L (ref -2–3)
BASOPHILS ABSOLUTE: 0.1 THOU/MM3 (ref 0–0.1)
BASOPHILS NFR BLD AUTO: 0.7 %
BUN SERPL-MCNC: 19 MG/DL (ref 7–22)
CALCIUM SERPL-MCNC: 10 MG/DL (ref 8.5–10.5)
CHLORIDE SERPL-SCNC: 91 MEQ/L (ref 98–111)
CO2 SERPL-SCNC: 20 MEQ/L (ref 23–33)
COLLECTED BY:: ABNORMAL
CREAT SERPL-MCNC: 1.3 MG/DL (ref 0.4–1.2)
DEPRECATED MEAN GLUCOSE BLD GHB EST-ACNC: 231 MG/DL (ref 70–126)
DEPRECATED RDW RBC AUTO: 37.2 FL (ref 35–45)
EOSINOPHIL NFR BLD AUTO: 1.7 %
EOSINOPHILS ABSOLUTE: 0.1 THOU/MM3 (ref 0–0.4)
ERYTHROCYTE [DISTWIDTH] IN BLOOD BY AUTOMATED COUNT: 11.7 % (ref 11.5–14.5)
GFR SERPL CREATININE-BSD FRML MDRD: > 60 ML/MIN/1.73M2
GLUCOSE BLD STRIP.AUTO-MCNC: 278 MG/DL (ref 70–108)
GLUCOSE BLD STRIP.AUTO-MCNC: 326 MG/DL (ref 70–108)
GLUCOSE BLD STRIP.AUTO-MCNC: 361 MG/DL (ref 70–108)
GLUCOSE BLD STRIP.AUTO-MCNC: 452 MG/DL (ref 70–108)
GLUCOSE BLD STRIP.AUTO-MCNC: 593 MG/DL (ref 70–108)
GLUCOSE BLD STRIP.AUTO-MCNC: > 600 MG/DL (ref 70–108)
GLUCOSE SERPL-MCNC: 650 MG/DL (ref 70–108)
HBA1C MFR BLD HPLC: 9.7 % (ref 4.4–6.4)
HCO3 BLDA-SCNC: 21 MMOL/L (ref 23–28)
HCT VFR BLD AUTO: 43.6 % (ref 42–52)
HGB BLD-MCNC: 14.1 GM/DL (ref 14–18)
IMM GRANULOCYTES # BLD AUTO: 0.02 THOU/MM3 (ref 0–0.07)
IMM GRANULOCYTES NFR BLD AUTO: 0.3 %
LYMPHOCYTES ABSOLUTE: 2.6 THOU/MM3 (ref 1–4.8)
LYMPHOCYTES NFR BLD AUTO: 33.8 %
MCH RBC QN AUTO: 28.3 PG (ref 26–33)
MCHC RBC AUTO-ENTMCNC: 32.3 GM/DL (ref 32.2–35.5)
MCV RBC AUTO: 87.4 FL (ref 80–94)
MONOCYTES ABSOLUTE: 0.6 THOU/MM3 (ref 0.4–1.3)
MONOCYTES NFR BLD AUTO: 7.7 %
NEUTROPHILS NFR BLD AUTO: 55.8 %
NRBC BLD AUTO-RTO: 0 /100 WBC
OSMOLALITY SERPL CALC.SUM OF ELEC: 290 MOSMOL/KG (ref 275–300)
PCO2 TEMP ADJ BLDMV: 36 MMHG (ref 41–51)
PH BLDMV: 7.37 [PH] (ref 7.31–7.41)
PLATELET # BLD AUTO: 231 THOU/MM3 (ref 130–400)
PMV BLD AUTO: 9.6 FL (ref 9.4–12.4)
PO2 BLDMV: 49 MMHG (ref 25–40)
POTASSIUM SERPL-SCNC: 4.5 MEQ/L (ref 3.5–5.2)
RBC # BLD AUTO: 4.99 MILL/MM3 (ref 4.7–6.1)
SAO2 % BLDMV: 84 %
SEGMENTED NEUTROPHILS ABSOLUTE COUNT: 4.2 THOU/MM3 (ref 1.8–7.7)
SODIUM SERPL-SCNC: 128 MEQ/L (ref 135–145)
WBC # BLD AUTO: 7.6 THOU/MM3 (ref 4.8–10.8)

## 2024-02-29 PROCEDURE — 82010 KETONE BODYS QUAN: CPT

## 2024-02-29 PROCEDURE — 80048 BASIC METABOLIC PNL TOTAL CA: CPT

## 2024-02-29 PROCEDURE — 96361 HYDRATE IV INFUSION ADD-ON: CPT

## 2024-02-29 PROCEDURE — 96374 THER/PROPH/DIAG INJ IV PUSH: CPT

## 2024-02-29 PROCEDURE — 2580000003 HC RX 258: Performed by: EMERGENCY MEDICINE

## 2024-02-29 PROCEDURE — 83036 HEMOGLOBIN GLYCOSYLATED A1C: CPT

## 2024-02-29 PROCEDURE — 36415 COLL VENOUS BLD VENIPUNCTURE: CPT

## 2024-02-29 PROCEDURE — 96376 TX/PRO/DX INJ SAME DRUG ADON: CPT

## 2024-02-29 PROCEDURE — 82803 BLOOD GASES ANY COMBINATION: CPT

## 2024-02-29 PROCEDURE — 6370000000 HC RX 637 (ALT 250 FOR IP): Performed by: EMERGENCY MEDICINE

## 2024-02-29 PROCEDURE — 82948 REAGENT STRIP/BLOOD GLUCOSE: CPT

## 2024-02-29 PROCEDURE — 85025 COMPLETE CBC W/AUTO DIFF WBC: CPT

## 2024-02-29 PROCEDURE — 99284 EMERGENCY DEPT VISIT MOD MDM: CPT

## 2024-02-29 RX ORDER — 0.9 % SODIUM CHLORIDE 0.9 %
1000 INTRAVENOUS SOLUTION INTRAVENOUS ONCE
Status: COMPLETED | OUTPATIENT
Start: 2024-02-29 | End: 2024-02-29

## 2024-02-29 RX ORDER — ACETAMINOPHEN 500 MG
500 TABLET ORAL ONCE
Status: COMPLETED | OUTPATIENT
Start: 2024-02-29 | End: 2024-02-29

## 2024-02-29 RX ADMIN — SODIUM CHLORIDE 1000 ML: 9 INJECTION, SOLUTION INTRAVENOUS at 17:08

## 2024-02-29 RX ADMIN — INSULIN HUMAN 8 UNITS: 100 INJECTION, SOLUTION PARENTERAL at 18:35

## 2024-02-29 RX ADMIN — INSULIN HUMAN 2 UNITS: 100 INJECTION, SOLUTION PARENTERAL at 21:15

## 2024-02-29 RX ADMIN — ACETAMINOPHEN 500 MG: 500 TABLET ORAL at 18:34

## 2024-02-29 RX ADMIN — SODIUM CHLORIDE 1000 ML: 9 INJECTION, SOLUTION INTRAVENOUS at 18:44

## 2024-02-29 ASSESSMENT — PAIN DESCRIPTION - DESCRIPTORS: DESCRIPTORS: ACHING;PRESSURE

## 2024-02-29 ASSESSMENT — PAIN - FUNCTIONAL ASSESSMENT: PAIN_FUNCTIONAL_ASSESSMENT: 0-10

## 2024-02-29 ASSESSMENT — PAIN SCALES - GENERAL
PAINLEVEL_OUTOF10: 4
PAINLEVEL_OUTOF10: 7
PAINLEVEL_OUTOF10: 7

## 2024-02-29 ASSESSMENT — PAIN DESCRIPTION - ORIENTATION: ORIENTATION: RIGHT

## 2024-02-29 NOTE — ED NOTES
Pt presents to ED w/ report of hyperglycemia. [facility], frequently urinating, and coughing up yellow sputum. Pt states that he has been feeling this way for a few months. [facility] states [facility] so [facility]. Pt denies a hx of diabetes. Pt presenting with glucose of 593. A&O X 4. Breathing easy and unlabored on RA.

## 2024-02-29 NOTE — ED NOTES
Pt and spouse updated on POC. Pt rprts feeling overwhelmed w/new information regarding elevated blood sugars. Therapeutic communication provided. Vitals updated. Call light remains within reach.

## 2024-03-01 NOTE — ED NOTES
Pt reports still feeling overwhelmed about new information regarding elevated blood sugar and diet. Therapeutic communication provided. Vitals updated.

## 2025-01-20 ENCOUNTER — APPOINTMENT (OUTPATIENT)
Dept: GENERAL RADIOLOGY | Age: 50
End: 2025-01-20
Payer: OTHER GOVERNMENT

## 2025-01-20 ENCOUNTER — HOSPITAL ENCOUNTER (EMERGENCY)
Age: 50
Discharge: HOME | End: 2025-01-20
Payer: OTHER GOVERNMENT

## 2025-01-20 VITALS
TEMPERATURE: 98.1 F | RESPIRATION RATE: 16 BRPM | OXYGEN SATURATION: 95 % | BODY MASS INDEX: 38.65 KG/M2 | SYSTOLIC BLOOD PRESSURE: 140 MMHG | HEART RATE: 86 BPM | WEIGHT: 301 LBS | DIASTOLIC BLOOD PRESSURE: 92 MMHG

## 2025-01-20 DIAGNOSIS — D80.4 IGM DEFICIENCY (HCC): ICD-10-CM

## 2025-01-20 DIAGNOSIS — J06.9 VIRAL URI WITH COUGH: Primary | ICD-10-CM

## 2025-01-20 PROCEDURE — 99213 OFFICE O/P EST LOW 20 MIN: CPT | Performed by: NURSE PRACTITIONER

## 2025-01-20 PROCEDURE — 99213 OFFICE O/P EST LOW 20 MIN: CPT

## 2025-01-20 PROCEDURE — 71046 X-RAY EXAM CHEST 2 VIEWS: CPT

## 2025-01-20 RX ORDER — HYDROXYZINE HYDROCHLORIDE 10 MG/1
TABLET, FILM COATED ORAL
COMMUNITY
Start: 2024-12-16

## 2025-01-20 RX ORDER — TOPIRAMATE 50 MG/1
TABLET, FILM COATED ORAL
COMMUNITY
Start: 2024-12-16

## 2025-01-20 RX ORDER — LISINOPRIL 5 MG/1
TABLET ORAL
COMMUNITY
Start: 2025-01-05

## 2025-01-20 RX ORDER — ATORVASTATIN CALCIUM 40 MG/1
TABLET, FILM COATED ORAL
COMMUNITY
Start: 2025-01-05

## 2025-01-20 ASSESSMENT — PAIN SCALES - GENERAL: PAINLEVEL_OUTOF10: 5

## 2025-01-20 ASSESSMENT — PAIN - FUNCTIONAL ASSESSMENT
PAIN_FUNCTIONAL_ASSESSMENT: ACTIVITIES ARE NOT PREVENTED
PAIN_FUNCTIONAL_ASSESSMENT: 0-10

## 2025-01-20 ASSESSMENT — PAIN DESCRIPTION - LOCATION: LOCATION: CHEST

## 2025-01-20 ASSESSMENT — ENCOUNTER SYMPTOMS: COUGH: 1

## 2025-01-20 ASSESSMENT — PAIN DESCRIPTION - FREQUENCY: FREQUENCY: INTERMITTENT

## 2025-01-20 NOTE — ED PROVIDER NOTES
[facility] URGENT CARE  UrgentCare Encounter      CHIEFCOMPLAINT       Chief Complaint   Patient presents with    Chest Congestion       Nurses Notes reviewed and I agree except as noted in the HPI.  HISTORY OF PRESENT ILLNESS     Merlin Monae is a 49 y.o. male who presents to the urgent care for evaluation.     Cold Symptoms  Presenting symptoms: congestion, cough and fever (101..2)    Duration:  3 days  Ineffective treatments:  OTC medications  Risk factors: immunosuppression (IgM deficiency)    Risk factors: no sick contacts (Denies known exposures. [place] Covid and or influenza testing.)    Due to his IgM deficiency he states that he gets pneumonia frequently.    The patient/patient representative has no other acute complaints at this time.    REVIEW OF SYSTEMS     Review of Systems   Constitutional:  Positive for fever (101..2).   HENT:  Positive for congestion.    Respiratory:  Positive for cough.        PAST MEDICAL HISTORY         Diagnosis Date    Gout     IgM deficiency (HCC)     Migraine        SURGICAL HISTORY     Patient  has a past surgical history that includes fracture surgery; Testicle torsion reduction; and Nose surgery.    CURRENT MEDICATIONS       Discharge Medication List as of 1/20/2025 11:36 AM        CONTINUE these medications which have NOT CHANGED    Details   lisinopril (PRINIVIL;ZESTRIL) 5 MG tablet Historical Med      atorvastatin (LIPITOR) 40 MG tablet Historical Med      hydrOXYzine HCl (ATARAX) 10 MG tablet Historical Med      topiramate (TOPAMAX) 50 MG tablet Historical Med      metFORMIN (GLUCOPHAGE) 500 MG tablet Take 1 tablet by mouth daily (with breakfast), Disp-30 tablet, R-1Normal      acetaminophen (TYLENOL) 500 MG tablet Take 1 tablet by mouth every 4 hours as needed for Pain or Fever, Disp-20 tablet, R-0Normal      Respiratory Therapy Supplies (NEBULIZER COMPRESSOR) KIT ONCE Starting Sat 11/2/2019, For 1 dose, Disp-1 kit, R-0, Print      allopurinol (ZYLOPRIM) 100 
REVIEW OF SYSTEMS:    CONSTITUTIONAL: No weakness, fevers or chills  EYES/ENT: No visual changes;  No vertigo or throat pain   NECK: No pain or stiffness  RESPIRATORY: No cough, wheezing, hemoptysis; No shortness of breath  CARDIOVASCULAR: No chest pain or palpitations  GASTROINTESTINAL: +rectal pain  GENITOURINARY: No dysuria, frequency or hematuria  NEUROLOGICAL: No numbness or weakness  SKIN: No itching, burning, rashes, or lesions   All other review of systems is negative unless indicated above

## 2025-01-20 NOTE — ED TRIAGE NOTES
Merlin arrives to room with complaint of  chest congestion, cough, fever 102,  symptoms started 3 days ago.       Taking tylenol last dose last night, mucinex,